# Patient Record
Sex: FEMALE | Race: ASIAN | NOT HISPANIC OR LATINO | Employment: PART TIME | URBAN - METROPOLITAN AREA
[De-identification: names, ages, dates, MRNs, and addresses within clinical notes are randomized per-mention and may not be internally consistent; named-entity substitution may affect disease eponyms.]

---

## 2017-03-16 ENCOUNTER — OFFICE VISIT (OUTPATIENT)
Dept: FAMILY MEDICINE | Facility: CLINIC | Age: 32
End: 2017-03-16
Payer: COMMERCIAL

## 2017-03-16 VITALS
DIASTOLIC BLOOD PRESSURE: 77 MMHG | WEIGHT: 142 LBS | HEART RATE: 83 BPM | OXYGEN SATURATION: 97 % | HEIGHT: 58 IN | TEMPERATURE: 98.3 F | BODY MASS INDEX: 29.81 KG/M2 | SYSTOLIC BLOOD PRESSURE: 113 MMHG

## 2017-03-16 DIAGNOSIS — Z13.1 SCREENING FOR DIABETES MELLITUS: ICD-10-CM

## 2017-03-16 DIAGNOSIS — Z00.00 ROUTINE GENERAL MEDICAL EXAMINATION AT A HEALTH CARE FACILITY: Primary | ICD-10-CM

## 2017-03-16 DIAGNOSIS — E66.3 OVERWEIGHT (BMI 25.0-29.9): ICD-10-CM

## 2017-03-16 DIAGNOSIS — Z23 NEED FOR PROPHYLACTIC VACCINATION WITH TETANUS-DIPHTHERIA (TD): ICD-10-CM

## 2017-03-16 LAB
HBA1C MFR BLD: 5.7 % (ref 4.3–6)
LDLC SERPL DIRECT ASSAY-MCNC: 73 MG/DL

## 2017-03-16 PROCEDURE — 36415 COLL VENOUS BLD VENIPUNCTURE: CPT | Performed by: PREVENTIVE MEDICINE

## 2017-03-16 PROCEDURE — 83036 HEMOGLOBIN GLYCOSYLATED A1C: CPT | Performed by: PREVENTIVE MEDICINE

## 2017-03-16 PROCEDURE — 83721 ASSAY OF BLOOD LIPOPROTEIN: CPT | Performed by: PREVENTIVE MEDICINE

## 2017-03-16 PROCEDURE — 90471 IMMUNIZATION ADMIN: CPT | Performed by: PREVENTIVE MEDICINE

## 2017-03-16 PROCEDURE — 99395 PREV VISIT EST AGE 18-39: CPT | Mod: 25 | Performed by: PREVENTIVE MEDICINE

## 2017-03-16 PROCEDURE — 90715 TDAP VACCINE 7 YRS/> IM: CPT | Performed by: PREVENTIVE MEDICINE

## 2017-03-16 ASSESSMENT — PAIN SCALES - GENERAL: PAINLEVEL: NO PAIN (0)

## 2017-03-16 NOTE — PROGRESS NOTES
SUBJECTIVE:     CC: Tea Raygoza is an 31 year old woman who presents for preventive health visit.     Healthy Habits:    Do you get at least three servings of calcium containing foods daily (dairy, green leafy vegetables, etc.)? yes    Amount of exercise or daily activities, outside of work: 3 day(s) per week    Problems taking medications regularly not applicable    Medication side effects: No    Have you had an eye exam in the past two years? yes    Do you see a dentist twice per year? no    Do you have sleep apnea, excessive snoring or daytime drowsiness?no      Answers for HPI/ROS submitted by the patient on 3/15/2017   Annual Exam:  Getting at least 3 servings of Calcium per day:: Yes  Bi-annual eye exam:: Yes  Dental care twice a year:: NO  Sleep apnea or symptoms of sleep apnea:: None  Diet:: Regular (no restrictions)  Frequency of exercise:: 2-3 days/week  Taking medications regularly:: Not Applicable  Medication side effects:: Not applicable  Additional concerns today:: YES  Q1: Little interest or pleasure in doing things: 1=Several days  Q2: Feeling down, depressed or hopeless: 0=Not at all  PHQ-2 Score: 1  Duration of exercise:: 15-30 minutes    Has some concerns about swelling around armpit with friction when exercising. Exam consistent with adipose tissue.     Today's PHQ-2 Score:   PHQ-2 ( 1999 Pfizer) 3/15/2017 3/11/2016   Q1: Little interest or pleasure in doing things - 0   Q2: Feeling down, depressed or hopeless - 0   PHQ-2 Score - 0   Little interest or pleasure in doing things Several days -   Feeling down, depressed or hopeless Not at all -   PHQ-2 Score 1 -       Abuse: Current or Past(Physical, Sexual or Emotional)- No  Do you feel safe in your environment - Yes    Social History   Substance Use Topics     Smoking status: Never Smoker     Smokeless tobacco: Never Used     Alcohol use No     The patient does not drink >3 drinks per day nor >7 drinks per week.    Recent Labs   Lab Test   08/31/11   0913   CHOL  159   HDL  32*   LDL  84   TRIG  215*   CHOLHDLRATIO  5.0       Reviewed orders with patient.  Reviewed health maintenance and updated orders accordingly - Yes    Mammo Decision Support:  Mammogram not appropriate for this patient based on age.    Pertinent mammograms are reviewed under the imaging tab.  History of abnormal Pap smear: NO - age 30-65 PAP every 5 years with negative HPV co-testing recommended    Reviewed and updated as needed this visit by clinical staff  Tobacco  Allergies  Meds         Reviewed and updated as needed this visit by Provider        Past Medical History   Diagnosis Date     NO ACTIVE PROBLEMS       Past Surgical History   Procedure Laterality Date     No history of surgery         ROS:  C: NEGATIVE for fever, chills, change in weight  I: NEGATIVE for worrisome rashes, moles or lesions  E: NEGATIVE for vision changes or irritation  ENT: NEGATIVE for ear, mouth and throat problems  R: NEGATIVE for significant cough or SOB  B: NEGATIVE for masses, tenderness or discharge  CV: NEGATIVE for chest pain, palpitations or peripheral edema  GI: NEGATIVE for nausea, abdominal pain, heartburn, or change in bowel habits  : NEGATIVE for unusual urinary or vaginal symptoms. Periods are regular.  M: NEGATIVE for significant arthralgias or myalgia  N: NEGATIVE for weakness, dizziness or paresthesias  E: NEGATIVE for temperature intolerance, skin/hair changes  H: NEGATIVE for bleeding problems  P: NEGATIVE for changes in mood or affect    Problem list, Medication list, Allergies, and Medical/Social/Surgical histories reviewed in EPIC and updated as appropriate.  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      No current outpatient prescriptions on file.     Allergies   Allergen Reactions     Nkda [No Known Drug Allergies]      OBJECTIVE:     /77  Pulse 83  Temp 98.3  F (36.8  C) (Oral)  Ht 4' 10\" (1.473 m)  Wt 142 lb (64.4 kg)  LMP 03/06/2017 (Exact Date)  SpO2 97%  Breastfeeding? No  BMI 29.68 kg/m2  EXAM:  GENERAL: healthy, alert and no distress  EYES: Eyes grossly normal to inspection, PERRL and conjunctivae and sclerae normal  HENT: ear canals and TM's normal, nose and mouth without ulcers or lesions  NECK: no adenopathy, no asymmetry, masses, or scars and thyroid normal to palpation  RESP: lungs clear to auscultation - no rales, rhonchi or wheezes  BREAST: normal without masses, tenderness or nipple discharge and no palpable axillary masses or adenopathy,   CV: regular rate and rhythm, normal S1 S2, no S3 or S4, no murmur, click or rub, no peripheral edema and peripheral pulses strong  ABDOMEN: soft, nontender, no hepatosplenomegaly, no masses   MS: no gross musculoskeletal defects noted, no edema  SKIN: no suspicious lesions or rashes  NEURO: Normal strength and tone, mentation intact and speech normal  PSYCH: mentation appears normal, affect normal/bright  LYMPH: no cervical, supraclavicular, axillary adenopathy    ASSESSMENT/PLAN:     Tea was seen today for physical.    Diagnoses and all orders for this visit:    Routine general medical examination at a health care facility  -     LDL cholesterol " "direct  -     Hemoglobin A1c    Need for prophylactic vaccination with tetanus-diphtheria (TD)  -     TDAP (ADACEL AGES 11-64)  -     ADMIN 1st VACCINE    Screening for diabetes mellitus  -     Hemoglobin A1c    Overweight (BMI 25.0-29.9)        COUNSELING:   Reviewed preventive health counseling, as reflected in patient instructions       Regular exercise       Healthy diet/nutrition       Vaccinated for: TDAP         reports that she has never smoked. She has never used smokeless tobacco.    Estimated body mass index is 29.68 kg/(m^2) as calculated from the following:    Height as of this encounter: 4' 10\" (1.473 m).    Weight as of this encounter: 142 lb (64.4 kg).   Weight management plan: Discussed healthy diet and exercise guidelines and patient will follow up in 12 months in clinic to re-evaluate.    Counseling Resources:  ATP IV Guidelines  Pooled Cohorts Equation Calculator  Breast Cancer Risk Calculator  FRAX Risk Assessment  ICSI Preventive Guidelines  Dietary Guidelines for Americans, 2010  USDA's MyPlate  ASA Prophylaxis  Lung CA Screening    Caryl Parekh MD MPH    [facility]  "

## 2017-03-16 NOTE — NURSING NOTE
"Chief Complaint   Patient presents with     Physical       Initial /77  Pulse 83  Temp 98.3  F (36.8  C) (Oral)  Ht 4' 10\" (1.473 m)  Wt 142 lb (64.4 kg)  LMP 03/06/2017 (Exact Date)  SpO2 97%  Breastfeeding? No  BMI 29.68 kg/m2 Estimated body mass index is 29.68 kg/(m^2) as calculated from the following:    Height as of this encounter: 4' 10\" (1.473 m).    Weight as of this encounter: 142 lb (64.4 kg).  Medication Reconciliation: complete   Jojo MILAN        "

## 2017-03-16 NOTE — NURSING NOTE
Screening Questionnaire for Adult Immunization    Are you sick today?   No   Do you have allergies to medications, food, a vaccine component or latex?   No   Have you ever had a serious reaction after receiving a vaccination?   No   Do you have a long-term health problem with heart disease, lung disease, asthma, kidney disease, metabolic disease (e.g. diabetes), anemia, or other blood disorder?   No   Do you have cancer, leukemia, HIV/AIDS, or any other immune system problem?   No   In the past 3 months, have you taken medications that affect  your immune system, such as prednisone, other steroids, or anticancer drugs; drugs for the treatment of rheumatoid arthritis, Crohn s disease, or psoriasis; or have you had radiation treatments?   No   Have you had a seizure, or a brain or other nervous system problem?   No   During the past year, have you received a transfusion of blood or blood     products, or been given immune (gamma) globulin or antiviral drug?   No   For women: Are you pregnant or is there a chance you could become        pregnant during the next month?   No   Have you received any vaccinations in the past 4 weeks?   No     Immunization questionnaire answers were all negative.      MNVFC doesn't apply on this patient    Per orders of Dr. Parekh, injection of Tdap given by Marci Bhakta. Patient instructed to remain in clinic for 20 minutes afterwards, and to report any adverse reaction to me immediately.       Screening performed by Marci Bhakta on 3/16/2017 at 1:42 PM.

## 2017-03-16 NOTE — MR AVS SNAPSHOT
After Visit Summary   3/16/2017    Tea Raygoza    MRN: [id number]           Patient Information     Date Of Birth          1985        Visit Information        Provider Department      3/16/2017 1:00 PM Caryl Parekh MD [facility]        Today's Diagnoses     Routine general medical examination at a health care facility    -  1    Need for prophylactic vaccination with tetanus-diphtheria (TD)        Screening for diabetes mellitus        Overweight (BMI 25.0-29.9)          Care Instructions    At [facility], we strive to deliver an exceptional experience to you, every time we see you.    If you receive a survey in the mail, please send us back your thoughts. We really do value your feedback.    Thank you for visiting [facility]    Normal or non-critical lab and imaging results will be communicated to you by MyChart, letter or phone within 7 days.  If you do not hear from us within 10 days, please call the clinic. If you have a critical or abnormal lab result, we will notify you by phone as soon as possible.     If you have any questions regarding your visit please contact:     Team Dianne/Spirit  Clinic Hours Telephone Number   Dr. Danny Greenwood   7am-7pm  Monday through Thursday  7am-5pm Friday [phone]  Jennifer ALAS RN   Pharmacy 8:30am-9pm Monday-Friday    9am-5pm Saturday-Sunday [phone]   Urgent Care 11am-9pm Monday-Friday        9am-5pm Saturday-Sunday [phone]     After hours, weekend or if you need to make an appointment with your primary provider please call [phone].   After Hours nurse advise: call [facility]: [phone]    Use MagForcehart (secure email communication and access to your chart) to send your primary care provider a message or make an appointment. Ask someone on your Team  how to sign up for [employer]. To log on to [employer] or for more information in LaunchRock please visit the website at [email or web address].   As of October 8, 2013, all password changes, disabled accounts, or ID changes in [employer]/MyHealth will be done by our Access Services Department.   If you need help with your account or password, call: [phone]. Clinic staff no longer has the ability to change passwords.     Preventive Health Recommendations  Female Ages 26 - 39  Yearly exam:   See your health care provider every year in order to    Review health changes.     Discuss preventive care.      Review your medicines if you your doctor has prescribed any.    Until age 30: Get a Pap test every three years (more often if you have had an abnormal result).    After age 30: Talk to your doctor about whether you should have a Pap test every 3 years or have a Pap test with HPV screening every 5 years.   You do not need a Pap test if your uterus was removed (hysterectomy) and you have not had cancer.  You should be tested each year for STDs (sexually transmitted diseases), if you're at risk.   Talk to your provider about how often to have your cholesterol checked.  If you are at risk for diabetes, you should have a diabetes test (fasting glucose).  Shots: Get a flu shot each year. Get a tetanus shot every 10 years.   Nutrition:     Eat at least 5 servings of fruits and vegetables each day.    Eat whole-grain bread, whole-wheat pasta and brown rice instead of white grains and rice.    Talk to your provider about Calcium and Vitamin D.     Lifestyle    Exercise at least 150 minutes a week (30 minutes a day, 5 days of the week). This will help you control your weight and prevent disease.    Limit alcohol to one drink per day.    No smoking.     Wear sunscreen to prevent skin cancer.    See your dentist every six months for an exam and cleaning.          Follow-ups after your visit        Follow-up notes from your care team   "   Return in about 1 year (around 3/16/2018) for Routine Visit.      Who to contact     If you have questions or need follow up information about today's clinic visit or your schedule please contact [facility] TARA IRVIN directly at [phone].  Normal or non-critical lab and imaging results will be communicated to you by MyChart, letter or phone within 4 business days after the clinic has received the results. If you do not hear from us within 7 days, please contact the clinic through Runivermaghart or phone. If you have a critical or abnormal lab result, we will notify you by phone as soon as possible.  Submit refill requests through Trovix or call your pharmacy and they will forward the refill request to us. Please allow 3 business days for your refill to be completed.          Additional Information About Your Visit        MyChart Information     Trovix gives you secure access to your electronic health record. If you see a primary care provider, you can also send messages to your care team and make appointments. If you have questions, please call your primary care clinic.  If you do not have a primary care provider, please call [phone] and they will assist you.        Care EveryWhere ID     This is your Care EveryWhere ID. This could be used by other organizations to access your [place] medical records  [id number]        Your Vitals Were     Pulse Temperature Height Last Period Pulse Oximetry Breastfeeding?    83 98.3  F (36.8  C) (Oral) 4' 10\" (1.473 m) 03/06/2017 (Exact Date) 97% No    BMI (Body Mass Index)                   29.68 kg/m2            Blood Pressure from Last 3 Encounters:   03/16/17 113/77   03/11/16 96/68   08/14/14 102/72    Weight from Last 3 Encounters:   03/16/17 142 lb (64.4 kg)   03/11/16 137 lb 2 oz (62.2 kg)   08/14/14 130 lb (59 kg)              We Performed the Following     ADMIN 1st VACCINE     Hemoglobin A1c     LDL cholesterol direct     TDAP (ADACEL AGES 11-64)     "    Primary Care Provider Fax #    [facility] [phone] [address]  [place] [address]        Thank you!     Thank you for choosing [facility]  for your care. Our goal is always to provide you with excellent care. Hearing back from our patients is one way we can continue to improve our services. Please take a few minutes to complete the written survey that you may receive in the mail after your visit with us. Thank you!             Your Updated Medication List - Protect others around you: Learn how to safely use, store and throw away your medicines at www.disposemymeds.org.      Notice  As of 3/16/2017  1:49 PM    You have not been prescribed any medications.

## 2017-03-16 NOTE — PATIENT INSTRUCTIONS
At [facility], we strive to deliver an exceptional experience to you, every time we see you.    If you receive a survey in the mail, please send us back your thoughts. We really do value your feedback.    Thank you for visiting [facility]    Normal or non-critical lab and imaging results will be communicated to you by MyChart, letter or phone within 7 days.  If you do not hear from us within 10 days, please call the clinic. If you have a critical or abnormal lab result, we will notify you by phone as soon as possible.     If you have any questions regarding your visit please contact:     Team Dianne/Spirit  Clinic Hours Telephone Number   Dr. Danny Greenwood   7am-7pm  Monday through Thursday  7am-5pm Friday [phone]  Jennifer ALAS RN   Pharmacy 8:30am-9pm Monday-Friday    9am-5pm Saturday-Sunday [phone]   Urgent Care 11am-9pm Monday-Friday        9am-5pm Saturday-Sunday [phone]     After hours, weekend or if you need to make an appointment with your primary provider please call [phone].   After Hours nurse advise: call [facility] [facility]: [phone]    Use [employer] Solutionshart (secure email communication and access to your chart) to send your primary care provider a message or make an appointment. Ask someone on your Team how to sign up for [employer]. To log on to BigTeams or for more information in DDN please visit the website at [email or web address].   As of October 8, 2013, all password changes, disabled accounts, or ID changes in AirXP/MyHealth will be done by our Access Services Department.   If you need help with your account or password, call: [phone]. Clinic staff no longer has the ability to change passwords.     Preventive Health Recommendations  Female Ages 26 - 39  Yearly exam:   See your health care provider every year in order  to    Review health changes.     Discuss preventive care.      Review your medicines if you your doctor has prescribed any.    Until age 30: Get a Pap test every three years (more often if you have had an abnormal result).    After age 30: Talk to your doctor about whether you should have a Pap test every 3 years or have a Pap test with HPV screening every 5 years.   You do not need a Pap test if your uterus was removed (hysterectomy) and you have not had cancer.  You should be tested each year for STDs (sexually transmitted diseases), if you're at risk.   Talk to your provider about how often to have your cholesterol checked.  If you are at risk for diabetes, you should have a diabetes test (fasting glucose).  Shots: Get a flu shot each year. Get a tetanus shot every 10 years.   Nutrition:     Eat at least 5 servings of fruits and vegetables each day.    Eat whole-grain bread, whole-wheat pasta and brown rice instead of white grains and rice.    Talk to your provider about Calcium and Vitamin D.     Lifestyle    Exercise at least 150 minutes a week (30 minutes a day, 5 days of the week). This will help you control your weight and prevent disease.    Limit alcohol to one drink per day.    No smoking.     Wear sunscreen to prevent skin cancer.    See your dentist every six months for an exam and cleaning.

## 2017-03-19 NOTE — PROGRESS NOTES
Tea, your test results were within normal limits. LDL cholesterol is at goal for you. Three month glucose value is normal, you do not have diabetes.     Please do not hesitate to call us at [phone] if you have any questions or concerns.    Thank you,    Caryl Parekh MD MPH

## 2017-11-08 ENCOUNTER — OFFICE VISIT (OUTPATIENT)
Dept: FAMILY MEDICINE | Facility: CLINIC | Age: 32
End: 2017-11-08
Payer: COMMERCIAL

## 2017-11-08 VITALS
HEART RATE: 89 BPM | OXYGEN SATURATION: 97 % | BODY MASS INDEX: 29.59 KG/M2 | DIASTOLIC BLOOD PRESSURE: 74 MMHG | SYSTOLIC BLOOD PRESSURE: 110 MMHG | WEIGHT: 141.6 LBS | TEMPERATURE: 98.8 F

## 2017-11-08 DIAGNOSIS — G56.01 CARPAL TUNNEL SYNDROME OF RIGHT WRIST: Primary | ICD-10-CM

## 2017-11-08 DIAGNOSIS — M67.431 GANGLION CYST OF WRIST, RIGHT: ICD-10-CM

## 2017-11-08 PROCEDURE — 99213 OFFICE O/P EST LOW 20 MIN: CPT | Performed by: PHYSICIAN ASSISTANT

## 2017-11-08 NOTE — NURSING NOTE
"Chief Complaint   Patient presents with     Musculoskeletal Problem     Startedlast week with numbness and tingling and yesterday and it got worse with a bump       Initial /74 (BP Location: Left arm, Patient Position: Chair, Cuff Size: Adult Regular)  Pulse 89  Temp 98.8  F (37.1  C) (Oral)  Wt 141 lb 9.6 oz (64.2 kg)  SpO2 97%  Breastfeeding? No  BMI 29.59 kg/m2 Estimated body mass index is 29.59 kg/(m^2) as calculated from the following:    Height as of 3/16/17: 4' 10\" (1.473 m).    Weight as of this encounter: 141 lb 9.6 oz (64.2 kg).  Medication Reconciliation: complete   Marcus Pool MA        "

## 2017-11-08 NOTE — PROGRESS NOTES
SUBJECTIVE:   Tea Raygoza is a 31 year old female who presents to clinic today for the following health issues:      Joint Pain    Onset: Last week    Description:   Location: right wrist  Character: numbness and tingling to palmar hand and forearm with a dorsal painless bump- got worse after sewing and doing some yoga    Intensity: severe    Progression of Symptoms: worse    Accompanying Signs & Symptoms:  Other symptoms: numbness, tingling and swelling    History:   Previous similar pain: no       Precipitating factors:   Trauma or overuse: no     Alleviating factors:  Improved by: nothing    Therapies Tried and outcome: none        does a lot of typing at work and lots of housework      Allergies   Allergen Reactions     Nkda [No Known Drug Allergies]        Past Medical History:   Diagnosis Date     NO ACTIVE PROBLEMS          No current outpatient prescriptions on file prior to visit.  No current facility-administered medications on file prior to visit.     Social History   Substance Use Topics     Smoking status: Never Smoker     Smokeless tobacco: Never Used     Alcohol use No       ROS:  Gen: np fevers  Musculoskel: + as above  Skin: as above    OBJECTIVE:  /74 (BP Location: Left arm, Patient Position: Chair, Cuff Size: Adult Regular)  Pulse 89  Temp 98.8  F (37.1  C) (Oral)  Wt 141 lb 9.6 oz (64.2 kg)  SpO2 97%  Breastfeeding? No  BMI 29.59 kg/m2   General:   awake, alert, and cooperative.  NAD.   Head: Normocephalic, atraumatic.  Eyes: Conjunctiva clear,   MS:  Rt wrist, KOBE no TTP no swelling and bruising. cap refill intact, radial pulse intact, + phalen and tinnel, rt dorsal wrist with soft, fluctuant ,nonttp, non erythematous cystic lesions ~ 1 cm diameter  Neuro: Alert and oriented - normal speech.    ASSESSMENT:    ICD-10-CM    1. Carpal tunnel syndrome of right wrist G56.01 [place] [facility]     [place]ULT [place]FERR[place]   2. Ganglion cyst of wrist, right M67.431 ORTHOPEDICS ADULT  REFERRAL           PLAN: NSAIDS  Advised about symptoms which might herald more serious problems.

## 2017-11-08 NOTE — MR AVS SNAPSHOT
After Visit Summary   11/8/2017    Tea Raygoza    MRN: [id number]           Patient Information     Date Of Birth          1985        Visit Information        Provider Department      11/8/2017 2:20 PM Pritesh Richardson PA [facility]        Today's Diagnoses     Carpal tunnel syndrome of right wrist    -  1    Ganglion cyst of wrist, right          Care Instructions    At [facility], we strive to deliver an exceptional experience to you, every time we see you.  If you receive a survey in the mail, please send us back your thoughts. We really do value your feedback.    Based on your medical history, these are the current health maintenance/preventive care services that you are due for (some may have been done at this visit.)  Health Maintenance Due   Topic Date Due     INFLUENZA VACCINE (SYSTEM ASSIGNED)  09/01/2017         Suggested websites for health information:  WwwThingWorx : Up to date and easily searchable information on multiple topics.  Www.Ubiregi.gov : medication info, interactive tutorials, watch real surgeries online  Www.familydoctor.org : good info from the Academy of Family Physicians  Www.cdc.gov : public health info, travel advisories, epidemics (H1N1)  Www.aap.org : children's health info, normal development, vaccinations  Www.health.[facility].[place].us : [place] dept of health, public health issues in [place], N1N1    Your care team:                            Family Medicine Internal Medicine   MD Zack Kamara MD Shantel Branch-Fleming, MD Katya Georgiev PA-C Nam Ho, MD Pediatrics   MEÑO Barker, LAINA Greenwood APRN MD Flores Rebollar MD Deborah Mielke, MD Kim Thein, JONNATHAN CNP      Clinic hours: Monday - Thursday 7 am-7 pm; Fridays 7 am-5 pm.   Urgent care: Monday - Friday 11 am-9 pm; Saturday and Sunday 9 am-5 pm.  Pharmacy : Monday -Thursday 8 am-8  pm; Friday 8 am-6 pm; Saturday and Sunday 9 am-5 pm.     Clinic: [phone]   Pharmacy: [phone]    Carpal Tunnel Syndrome    Carpal tunnel syndrome is a painful condition of the wrist and arm. It is caused by pressure on the median nerve.  The median nerve is one of the nerves that give feeling and movement to the hand. It passes through a tunnel in the wrist called the carpal tunnel. This tunnel is made up of bones and ligaments. Narrowing of this tunnel or swelling of the tissues inside the tunnel puts pressure on the median nerve. This causes numbness, pins and needles, or electric shooting pains in your hand and forearm. Often the pain is worse at night and may wake you when you are asleep.  Carpal tunnel syndrome may occur during pregnancy and with use of birth control pills. It is more common in workers who must often bend their wrists. It is also common in people who work with power tools that cause strong vibrations.  Home care    Rest the painful wrist. Avoid repeated bending of the wrist back and forth. This puts pressure on the median nerve. Avoid using power tools with strong vibrations.    If you were given a splint, wear it at night while you sleep. You may also wear it during the day for comfort.    Move your fingers and wrists often to avoid stiffness.    Elevate your arms on pillows when you lie down.    Try using the unaffected hand more.    Try not to hold your wrists in a bent, downward position.    Sometimes changes in the work place may ease symptoms. If you type most of the day, it may help to change the position of your keyboard or add a wrist support. Your wrist should be in a neutral position and not bent back when typing.    You may use over-the-counter pain medicine to treat pain and inflammation, unless another medicine was prescribed. Anti-inflammatory pain medicines, such as ibuprofen or naproxen may be more effective than acetaminophen, which treats pain, but not  inflammation. If you have chronic liver or kidney disease or ever had a stomach ulcer or GI bleeding, talk with your doctor before using these medicines.    Opioid pain medicine will only give temporary relief and does not treat the problem. If pain continues, you may need a shot of a steroid drug into your wrist.    If the above methods fail, you may need surgery. This will open the carpal tunnel and release the pressure on the trapped nerve.  Follow-up care  Follow up with your healthcare provider, or as advised, if the pain doesn t begin to improve within the next week.  If X-rays were taken, you will be notified of any new findings that may affect your care.  When to seek medical advice  Call your healthcare provider right away if any of these occur:    Pain not improving with the above treatment    Fingers or hand become cold, blue, numb, or tingly    Your whole arm becomes swollen or weak  Date Last Reviewed: 11/23/2015 2000-2017 [employer]. [address] [place] [address]. All rights reserved. This information is not intended as a substitute for professional medical care. Always follow your healthcare professional's instructions.        Ganglion Cyst    A ganglion cyst usually is a painless bump on the wrist or finger joint. It connects to the joint capsule and grows like a balloon on a stalk. It is filled with joint fluid. The cause of a ganglion cyst is not known.   If the cyst puts pressure on a nearby nerve it may cause pain. Otherwise, cysts are painless and harmless. Most tend to disappear over time without treatment. Do not try to drain or break the cyst at home. This can cause harm and does not cure the problem.  If you are having pain from the cyst, a temporary wrist splint may be helpful to limit wrist motion. If this does not help, the fluid can be removed from the cyst. This should shrink the size of the cyst. If this doesn t give relief, the ganglion can be removed by  surgery.  Home care    If you are having wrist pain, use a wrist splint for 1-2 weeks at a time. You can buy one at many drug stores without a prescription.    You may use over-the-counter pain medicine to control pain, unless another medicine was prescribed. If you have chronic liver or kidney disease or ever had a stomach ulcer or GI bleeding, talk with your healthcare provider before using these medicines.      If a needle was used to drain the cyst fluid or inject medicine into it, keep the site clean and covered with a bandage for the first 24 hours. If a pressure dressing was applied, leave it in place for the time advised.  Follow-up care  Follow up with your healthcare provider, or as advised by our staff. Make an appointment for a repeat exam if pain continues for more than 2 weeks in a wrist splint.  When to seek medical advice  Call your healthcare provider right away if any of these occur:    Increasing pain in the wrist    Redness over the cyst    Fluid draining from the cyst    Numbness or tingling in the hand or arm  Date Last Reviewed: 11/20/2015 2000-2017 [employer]. [address] [place] [address]. All rights reserved. This information is not intended as a substitute for professional medical care. Always follow your healthcare professional's instructions.                Follow-ups after your visit        Additional Services     ORTHOPEDICS ADULT REFERRAL       Your provider has referred you to: [facility]: [facility] [phone]    [email or web address]    Please be aware that coverage of these services is subject to the terms and limitations of your health insurance plan.  Call member services at your health plan with any benefit or coverage questions.      Please bring the following to your appointment:    >>   Any x-rays, CTs or MRIs which have been performed.  Contact the facility where they were done to arrange  for  prior to your scheduled appointment.  Any new CT, MRI or other procedures ordered by your specialist must be performed at a [facility] facility or coordinated by your clinic's referral office.    >>   List of current medications   >>   This referral request   >>   Any documents/labs given to you for this referral                  Follow-up notes from your care team     Return if symptoms worsen or fail to improve.      Who to contact     If you have questions or need follow up information about today's clinic visit or your schedule please contact [facility] TARA BRYAN directly at [phone].  Normal or non-critical lab and imaging results will be communicated to you by [employer], letter or phone within 4 business days after the clinic has received the results. If you do not hear from us within 7 days, please contact the clinic through [email or web address] or phone. If you have a critical or abnormal lab result, we will notify you by phone as soon as possible.  Submit refill requests through [employer] or call your pharmacy and they will forward the refill request to us. Please allow 3 business days for your refill to be completed.          Additional Information About Your Visit        MyChart Information     [employer] gives you secure access to your electronic health record. If you see a primary care provider, you can also send messages to your care team and make appointments. If you have questions, please call your primary care clinic.  If you do not have a primary care provider, please call [phone] and they will assist you.        Care EveryWhere ID     This is your Care EveryWhere ID. This could be used by other organizations to access your [facility] medical records  [id number]        Your Vitals Were     Pulse Temperature Pulse Oximetry Breastfeeding? BMI (Body Mass Index)       89 98.8  F (37.1  C) (Oral) 97% No 29.59 kg/m2        Blood Pressure from Last 3 Encounters:   11/08/17 110/74   03/16/17  113/77   03/11/16 96/68    Weight from Last 3 Encounters:   11/08/17 141 lb 9.6 oz (64.2 kg)   03/16/17 142 lb (64.4 kg)   03/11/16 137 lb 2 oz (62.2 kg)              We Performed the Following     ORTHOPEDICS ADULT REFERRAL          Today's Medication Changes          These changes are accurate as of: 11/8/17  2:59 PM.  If you have any questions, ask your nurse or doctor.               Start taking these medicines.        Dose/Directions    order for DME   Used for:  Carpal tunnel syndrome of right wrist   Started by:  Pritesh Richardson PA        Dose:  1 Device   1 Device by Device route once for 1 dose Right wrist brace - use as instructed   Quantity:  1 Device   Refills:  0            Where to get your medicines      Some of these will need a paper prescription and others can be bought over the counter.  Ask your nurse if you have questions.     You don't need a prescription for these medications     order for DME                Primary Care Provider Fax #    [facility] [phone] [address] [facility] [address]  [facility] [address]        Equal Access to Services     [facility] Hospital[facility] FRANCISCO : Hadii lashae nicole hadasho Sovalarie, waaxda luqadaha, qaybta kaalmada adeegyada, ginger goel. So [facility] [phone].    ATENCIÓN: Si habla español, tiene a poole disposición servicios gratuitos de asistencia lingüística. Llame al [phone].    We comply with applicable federal civil rights laws and [place] laws. We do not discriminate on the basis of race, color, national origin, age, disability, sex, sexual orientation, or gender identity.            Thank you!     Thank you for choosing [facility]  for your care. Our goal is always to provide you with excellent care. Hearing back from our patients is one way we can continue to improve our services. Please take a few minutes to complete the written survey that you may receive in the mail after  your visit with us. Thank you!             Your Updated Medication List - Protect others around you: Learn how to safely use, store and throw away your medicines at www.disposemymeds.org.          This list is accurate as of: 11/8/17  2:59 PM.  Always use your most recent med list.                   Brand Name Dispense Instructions for use Diagnosis    order for DME     1 Device    1 Device by Device route once for 1 dose Right wrist brace - use as instructed    Carpal tunnel syndrome of right wrist

## 2017-11-08 NOTE — PATIENT INSTRUCTIONS
At [facility], we strive to deliver an exceptional experience to you, every time we see you.  If you receive a survey in the mail, please send us back your thoughts. We really do value your feedback.    Based on your medical history, these are the current health maintenance/preventive care services that you are due for (some may have been done at this visit.)  Health Maintenance Due   Topic Date Due     INFLUENZA VACCINE (SYSTEM ASSIGNED)  09/01/2017         Suggested websites for health information:  Www.[employer].org : Up to date and easily searchable information on multiple topics.  Www.[employer].gov : medication info, interactive tutorials, watch real surgeries online  Www.familydoctor.org : good info from the Academy of Family Physicians  Www.cdc.gov : public health info, travel advisories, epidemics (H1N1)  Www.aap.org : children's health info, normal development, vaccinations  Www.health.[facility].mn.us : [place] dept of health, public health issues in [place], N1N1    Your care team:                            Family Medicine Internal Medicine   MD Zack Kamara MD Shantel Branch-Fleming, MD Katya Georgiev PA-C Nam Ho, MD Pediatrics   MEÑO Barker, CNP Mila Greenwood APRN CNP   MD Flores Sabillon MD Deborah Mielke, MD Kim Thein, APRN CNP      Clinic hours: Monday - Thursday 7 am-7 pm; Fridays 7 am-5 pm.   Urgent care: Monday - Friday 11 am-9 pm; Saturday and Sunday 9 am-5 pm.  Pharmacy : Monday -Thursday 8 am-8 pm; Friday 8 am-6 pm; Saturday and Sunday 9 am-5 pm.     Clinic: [phone]   Pharmacy: [phone]    Carpal Tunnel Syndrome    Carpal tunnel syndrome is a painful condition of the wrist and arm. It is caused by pressure on the median nerve.  The median nerve is one of the nerves that give feeling and movement to the hand. It passes through a tunnel in the wrist called the carpal tunnel. This tunnel is made up of  bones and ligaments. Narrowing of this tunnel or swelling of the tissues inside the tunnel puts pressure on the median nerve. This causes numbness, pins and needles, or electric shooting pains in your hand and forearm. Often the pain is worse at night and may wake you when you are asleep.  Carpal tunnel syndrome may occur during pregnancy and with use of birth control pills. It is more common in workers who must often bend their wrists. It is also common in people who work with power tools that cause strong vibrations.  Home care    Rest the painful wrist. Avoid repeated bending of the wrist back and forth. This puts pressure on the median nerve. Avoid using power tools with strong vibrations.    If you were given a splint, wear it at night while you sleep. You may also wear it during the day for comfort.    Move your fingers and wrists often to avoid stiffness.    Elevate your arms on pillows when you lie down.    Try using the unaffected hand more.    Try not to hold your wrists in a bent, downward position.    Sometimes changes in the work place may ease symptoms. If you type most of the day, it may help to change the position of your keyboard or add a wrist support. Your wrist should be in a neutral position and not bent back when typing.    You may use over-the-counter pain medicine to treat pain and inflammation, unless another medicine was prescribed. Anti-inflammatory pain medicines, such as ibuprofen or naproxen may be more effective than acetaminophen, which treats pain, but not inflammation. If you have chronic liver or kidney disease or ever had a stomach ulcer or GI bleeding, talk with your doctor before using these medicines.    Opioid pain medicine will only give temporary relief and does not treat the problem. If pain continues, you may need a shot of a steroid drug into your wrist.    If the above methods fail, you may need surgery. This will open the carpal tunnel and release the pressure on the  trapped nerve.  Follow-up care  Follow up with your healthcare provider, or as advised, if the pain doesn t begin to improve within the next week.  If X-rays were taken, you will be notified of any new findings that may affect your care.  When to seek medical advice  Call your healthcare provider right away if any of these occur:    Pain not improving with the above treatment    Fingers or hand become cold, blue, numb, or tingly    Your whole arm becomes swollen or weak  Date Last Reviewed: 11/23/2015 2000-2017 [employer]. [address] [place] [address]. All rights reserved. This information is not intended as a substitute for professional medical care. Always follow your healthcare professional's instructions.        Ganglion Cyst    A ganglion cyst usually is a painless bump on the wrist or finger joint. It connects to the joint capsule and grows like a balloon on a stalk. It is filled with joint fluid. The cause of a ganglion cyst is not known.   If the cyst puts pressure on a nearby nerve it may cause pain. Otherwise, cysts are painless and harmless. Most tend to disappear over time without treatment. Do not try to drain or break the cyst at home. This can cause harm and does not cure the problem.  If you are having pain from the cyst, a temporary wrist splint may be helpful to limit wrist motion. If this does not help, the fluid can be removed from the cyst. This should shrink the size of the cyst. If this doesn t give relief, the ganglion can be removed by surgery.  Home care    If you are having wrist pain, use a wrist splint for 1-2 weeks at a time. You can buy one at many drug stores without a prescription.    You may use over-the-counter pain medicine to control pain, unless another medicine was prescribed. If you have chronic liver or kidney disease or ever had a stomach ulcer or GI bleeding, talk with your healthcare provider before using these medicines.      If a needle was  used to drain the cyst fluid or inject medicine into it, keep the site clean and covered with a bandage for the first 24 hours. If a pressure dressing was applied, leave it in place for the time advised.  Follow-up care  Follow up with your healthcare provider, or as advised by our staff. Make an appointment for a repeat exam if pain continues for more than 2 weeks in a wrist splint.  When to seek medical advice  Call your healthcare provider right away if any of these occur:    Increasing pain in the wrist    Redness over the cyst    Fluid draining from the cyst    Numbness or tingling in the hand or arm  Date Last Reviewed: 11/20/2015 2000-2017 [employer]. [address] [place] [address], [place] [address]. All rights reserved. This information is not intended as a substitute for professional medical care. Always follow your healthcare professional's instructions.

## 2017-11-09 ENCOUNTER — OFFICE VISIT (OUTPATIENT)
Dept: ORTHOPEDICS | Facility: CLINIC | Age: 32
End: 2017-11-09
Payer: COMMERCIAL

## 2017-11-09 ENCOUNTER — RADIANT APPOINTMENT (OUTPATIENT)
Dept: GENERAL RADIOLOGY | Facility: CLINIC | Age: 32
End: 2017-11-09
Attending: PHYSICIAN ASSISTANT
Payer: COMMERCIAL

## 2017-11-09 VITALS — WEIGHT: 142 LBS | BODY MASS INDEX: 28.63 KG/M2 | RESPIRATION RATE: 16 BRPM | HEIGHT: 59 IN

## 2017-11-09 DIAGNOSIS — M67.431 GANGLION CYST OF DORSUM OF RIGHT WRIST: ICD-10-CM

## 2017-11-09 DIAGNOSIS — R22.31 MASS OF WRIST, RIGHT: ICD-10-CM

## 2017-11-09 DIAGNOSIS — G56.01 CARPAL TUNNEL SYNDROME OF RIGHT WRIST: Primary | ICD-10-CM

## 2017-11-09 PROCEDURE — 99243 OFF/OP CNSLTJ NEW/EST LOW 30: CPT | Performed by: ORTHOPAEDIC SURGERY

## 2017-11-09 PROCEDURE — 73110 X-RAY EXAM OF WRIST: CPT | Mod: RT

## 2017-11-09 ASSESSMENT — PAIN SCALES - GENERAL: PAINLEVEL: NO PAIN (0)

## 2017-11-09 NOTE — PROGRESS NOTES
Chief Complaint:   Chief Complaint   Patient presents with     Hand Pain     Right dorsal wrist mass and hand numbness. Hand numbness started about 1 week ago and mass showed up about 3 days ago. Rt hand dominant. Went to  yesterday was given a brace for at night and told to take aleve.         Tea Raygoza is seen today in the [facility] for evaluation of right dorsal wrist ganglion cyst, numbness and tingling at the request of LENIN Ryder.     HPI: Tea Raygoza is a 31 year old female , right -hand dominant, who presents for evaluation and management of a right dorsal wrist mass, no known injury. Her symptoms started as numbness and tingling in the right hand and wrist 1 week ago. Numbness and tingling can occur in the fingers, hand, wrist, forearm, and into the elbow.  About 2-3 days ago, while sewing, had some numbness and tingling. She stopped and looked at her hand and noticed a mass had surfaced over the right dorsal wrist. She was seen in urgent care yesterday and was given a wrist brace for the night and was told to take Aleve. She has no pain today, rated a 0/10. Presents today with her .    She reports having no pain/discomfort around the wrist mass. She denies any other similar masses to her upper extremity or other extremities.     Changes in size: No   Changes in color: No   Tenderness of mass: No   Hot/cold sensitivity: No   Pain severity: 0/10  Pain quality: NA  Frequency of symptoms: frequently.  Night pain: No   Fevers, chills, night sweats: No   Aggravating factors: none.  Relieving factors: at rest.    Previous treatment: wrist brace, Aleve    Past medical history:  has a past medical history of NO ACTIVE PROBLEMS.   Patient Active Problem List    Diagnosis Date Noted     Carrier or suspected carrier of group B Streptococcus 05/28/2011     Priority: Medium     Plan PCN IAP.       Anemia of mother, complicating pregnancy, childbirth, or the  "puerperium, unspecified as to episode of care(648.20) 03/26/2011     Priority: Medium     Not immune to rubella 12/24/2010     Priority: Medium     Plan pp vac at hospital.  Do you wish to do the replacement in the background? yes         Encounter for supervision of other normal pregnancy 12/22/2010     Priority: Medium     History of fast labor.  Girl!  Diagnosis updated by automated process. Provider to review and confirm.       CARDIOVASCULAR SCREENING; LDL GOAL LESS THAN 160 10/31/2010     Priority: Medium       Past surgical history:  has a past surgical history that includes no history of surgery.     Medications:    No current outpatient prescriptions on file.        Allergies:     Allergies   Allergen Reactions     Nkda [No Known Drug Allergies]         Family History: family history is not on file.     Social History: .  reports that she has never smoked. She has never used smokeless tobacco. She reports that she does not drink alcohol or use illicit drugs.    Review of Systems:  ROS: 10 point ROS neg other than the symptoms noted above in the HPI and past medical history.    This document serves as a record of the services and decisions personally performed and made by Rommel Parikh MD. It was created on his behalf by Jeane Still, a trained medical scribe. The creation of this document is based the provider's statements to the medical scribe.    Scribe Jeane Still 4:08 PM 11/9/2017    Physical Exam  Resp 16  Ht 1.499 m (4' 11\")  Wt 64.4 kg (142 lb)  BMI 28.68 kg/m2  GENERAL APPEARANCE: healthy, alert, no distress.   SKIN: no suspicious lesions or rashes  RESPIRATORY: No increased work of breathing.  NEURO:     strength: normal,    thenar fasiculations: negative    Thenar atrophy: none.    Sensation intact in  All digits but decreased in radial 3.5 right hand.   reflexes normal in upper extremities.   PSYCH:  mentation appears normal and affect normal, not anxious.    MUSCULOSKELETAL:    RIGHT " HAND / WRIST EXAM:  Skin intact. No abnormal skin discoloration, erythema or ecchymosis.   Normal wear pattern, color and tone.  Sensations are intact of the small and ulnar aspect of the ring finger. Decreased sensations of the radial ring finger, long, index fingers and thumb.    Noticeable mass, ~1.5cm diameter, located over dorsal ulnar wrist just distal to the dorsal prominence of the distal ulna.   Mass does transiluminate with light.  Mass no tender to palpation. Semi-firm, fluctuant.  Negative Tinel's over mass.    No abnormal skin color or atrophic changes over mass.  No other observable or palpable masses of the fingers or palm.  No palpable triggering of fingers.   No observable or palpable cords or nodules of the fingers or palm.    There is no swelling in the wrist, other than the mass  There is no tenderness in the wrist, other than the mass  There is no ecchymosis.  There is no erythema of the surrounding skin.  There is no maceration of the skin.  There is no deformity in the area.  Intact extensors. No extensor lag.    Special tests wrist:    Tinel's positive,    Phalen's positive.   Flexion/compression test positive.    Intact sensation light touch median, radial, ulnar nerves of the hand  Intact sensation to the radial and ulnar digital nerves of the fingers, as well as the finger tips.  Intact epl fpl fdp edc wrist flexion/extension biceps/triceps deltoid  Brisk capillary refill to all fingers.   Palpable radial pulse, 2+.    LEFT HAND/FINGERS:    Skin intact. No abnormal skin discoloration, erythema or ecchymosis.   No nail pitting or clubbing.  Normal wear pattern, color and tone.  No observable or palpable masses of the fingers or palm or wrist.  No palpable triggering of fingers.   No observable or palpable cords or nodules of the fingers or palm.    There is no swelling in the wrist, hand, forearm.  There is no tenderness in the wrist.  There is no ecchymosis.  There is no erythema of the  surrounding skin.  There is no maceration of the skin.  There is no deformity in the area.  Intact extensors. No extensor lag.    Special tests wrist:    Tinel's mild,    Phalen's negative     Median nerve compression test mild.     Intact sensation light touch median, radial, ulnar nerves of the hand  Intact sensation to the radial and ulnar digital nerves of the fingers, as well as the finger tips.  Intact epl fpl fdp edc wrist flexion/extension biceps/triceps deltoid  Brisk capillary refill to all fingers.   Palpable radial pulse, 2+.      X-rays:  3 views right wrist from 11/9/2017 were reviewed in clinic today. On my review, no obvious fractures or deformities.    Assessment: 31 year old female with right dorsal wrist mass, likely ganglion cyst, also bilateral carpal tunnel syndrome (R>L). Symptoms are most likely not related to the cyst given its location.    Plan:   MASS:  Discussed with patient that the mass is consistent with ganglion cyst, a common, benign tumor of the upper extremity. Less likely another type of tumor or neoplasm. Treatment options include: observation if asymptomatic or minimal symptoms, activity modification, splint, aspiration with cortisone injection (risks of recurrence > 50%), or surgical excision (risk of recurrence < 5-10%). Risks and benefits of each discussed in detail.    * in particular, risks of surgery include, but not limited to: bleeding, infection, pain, scar, damage to adjacent structures (nerves, vessels, bone, cartilage, tendons, ligaments), temporary versus permanent nerve injury, failure to relieve symptoms, recurrence of symptoms or recurrence of the mass, stiffness, need for further surgery, risks of anesthesia, blood clots, death.    * at this time, patient would like to proceed with non-operative treatment.  * no treatments at this time other than wrist brace with activities, night.  * return to clinic if mass becomes painful or increases in size. Consider  aspiration versus surgical excision.      CARPAL TUNNEL SYNDROME:  * discussed with patient signs and symptoms consistent with carpal tunnel syndrome, R>L. Carpal tunnel syndrome is compression or pinching of the median nerve at the wrist, as it enters the hand. There are many different causes, and in most cases, multifactorial.    * An indepth discussion was had with her about the options for treatment, which included activity modification to avoid aggravating activities, taking breaks during activities that cause symptoms, stretching, NSAIDS to help decrease inflammation and swelling within the carpal tunnel, night splinting, corticosteroid injections, and carpal tunnel release.   * depending upon severity and duration of symptoms, nonoperative treatment is usually initiated, starting with least invasive modalities such as activity modification and a trial of night splints and NSAIDs.  * Cortisone injections are considered to decrease swelling and inflammation within the carpal tunnel and compression of the nerve.   * Lastly, carpal tunnel release should symptoms persist despite trial of nonoperative treatment, or in cases of severe carpal tunnel syndrome.  * at this time, will proceed with a splint; use splint previously given by another provider.  * return to clinic as needed.  * all patient's questions addressed and answered today.      The information in this document, created by a scribe for me, accurately reflects the services I personally performed and the decisions made by me. I have reviewed and approved this document for accuracy.       Rommel Parikh M.D., M.S.  Dept. of Orthopaedic Surgery  [facility]

## 2017-11-09 NOTE — PATIENT INSTRUCTIONS
Please remember to call and schedule a follow up appointment if one was recommended at your earliest convenience.  Orthopedics CLINIC HOURS TELEPHONE NUMBER   Dr. Kati Evans  Certified Medical Assistant   Monday & Wednesday   8am - 5pm  Thursday 1pm - 5pm  Friday 8am -11:30am Specialty schedulers:   [phone] to schedule your surgery.  Main Clinic:   [phone] to make an appointment with any provider.    Urgent Care locations:    [facility] Monday-Friday Closed  Saturday-Sunday 9am-5pm      Monday-Friday 12pm - 8pm  Saturday-Sunday 9am-5pm [phone]     If SURGERY has been recommended, please call our Specialty Schedulers at [phone] to schedule your procedure.    If you need a medication refill, please contact your pharmacy. Please allow 3 business days for your refill to be completed.    If an MRI or CT scan has been recommended, please call [facility] Schedulers at [phone] to schedule your appointment.  Use [employer] (secure e-mail communication and access to your chart) to send a message or to make an appointment. Please ask how you can sign up for [employer].  Your care team's suggested websites for health information:   [email or web address] : Up to date and easily searchable information on multiple topics.   [email or web address] : [place] dept of heat, public health issues in [place], N1N1

## 2017-11-09 NOTE — LETTER
11/9/2017         RE: Tea Raygoza  [address]  [place] [address]        Dear Colleague,    Thank you for referring your patient, Tea Raygoza, to the [facility]. Please see a copy of my visit note below.    Chief Complaint:   Chief Complaint   Patient presents with     Hand Pain     Right dorsal wrist mass and hand numbness. Hand numbness started about 1 week ago and mass showed up about 3 days ago. Rt hand dominant. Went to  yesterday was given a brace for at night and told to take aleve.         Tea Raygoza is seen today in the [facility] for evaluation of right dorsal wrist ganglion cyst, numbness and tingling at the request of LENIN Ryder.     HPI: Tea Raygoza is a 31 year old female , right -hand dominant, who presents for evaluation and management of a right dorsal wrist mass, no known injury. Her symptoms started as numbness and tingling in the right hand and wrist 1 week ago. Numbness and tingling can occur in the fingers, hand, wrist, forearm, and into the elbow.  About 2-3 days ago, while sewing, had some numbness and tingling. She stopped and looked at her hand and noticed a mass had surfaced over the right dorsal wrist. She was seen in urgent care yesterday and was given a wrist brace for the night and was told to take Aleve. She has no pain today, rated a 0/10. Presents today with her .    She reports having no pain/discomfort around the wrist mass. She denies any other similar masses to her upper extremity or other extremities.     Changes in size: No   Changes in color: No   Tenderness of mass: No   Hot/cold sensitivity: No   Pain severity: 0/10  Pain quality: NA  Frequency of symptoms: frequently.  Night pain: No   Fevers, chills, night sweats: No   Aggravating factors: none.  Relieving factors: at rest.    Previous treatment: wrist brace, Aleve    Past medical history:  has a past medical  "history of NO ACTIVE PROBLEMS.   Patient Active Problem List    Diagnosis Date Noted     Carrier or suspected carrier of group B Streptococcus 05/28/2011     Priority: Medium     Plan PCN IAP.       Anemia of mother, complicating pregnancy, childbirth, or the puerperium, unspecified as to episode of care(648.20) 03/26/2011     Priority: Medium     Not immune to rubella 12/24/2010     Priority: Medium     Plan pp vac at hospital.  Do you wish to do the replacement in the background? yes         Encounter for supervision of other normal pregnancy 12/22/2010     Priority: Medium     History of fast labor.  Girl!  Diagnosis updated by automated process. Provider to review and confirm.       CARDIOVASCULAR SCREENING; LDL GOAL LESS THAN 160 10/31/2010     Priority: Medium       Past surgical history:  has a past surgical history that includes no history of surgery.     Medications:    No current outpatient prescriptions on file.        Allergies:     Allergies   Allergen Reactions     Nkda [No Known Drug Allergies]         Family History: family history is not on file.     Social History: .  reports that she has never smoked. She has never used smokeless tobacco. She reports that she does not drink alcohol or use illicit drugs.    Review of Systems:  ROS: 10 point ROS neg other than the symptoms noted above in the HPI and past medical history.    This document serves as a record of the services and decisions personally performed and made by Rommel Parikh MD. It was created on his behalf by Jeane Still, a trained medical scribe. The creation of this document is based the provider's statements to the medical scribe.    Scribe Jeane Still 4:08 PM 11/9/2017    Physical Exam  Resp 16  Ht 1.499 m (4' 11\")  Wt 64.4 kg (142 lb)  BMI 28.68 kg/m2  GENERAL APPEARANCE: healthy, alert, no distress.   SKIN: no suspicious lesions or rashes  RESPIRATORY: No increased work of breathing.  NEURO:     strength: normal,    thenar " fasiculations: negative    Thenar atrophy: none.    Sensation intact in  All digits but decreased in radial 3.5 right hand.   reflexes normal in upper extremities.   PSYCH:  mentation appears normal and affect normal, not anxious.    MUSCULOSKELETAL:    RIGHT HAND / WRIST EXAM:  Skin intact. No abnormal skin discoloration, erythema or ecchymosis.   Normal wear pattern, color and tone.  Sensations are intact of the small and ulnar aspect of the ring finger. Decreased sensations of the radial ring finger, long, index fingers and thumb.    Noticeable mass, ~1.5cm diameter, located over dorsal ulnar wrist just distal to the dorsal prominence of the distal ulna.   Mass does transiluminate with light.  Mass no tender to palpation. Semi-firm, fluctuant.  Negative Tinel's over mass.    No abnormal skin color or atrophic changes over mass.  No other observable or palpable masses of the fingers or palm.  No palpable triggering of fingers.   No observable or palpable cords or nodules of the fingers or palm.    There is no swelling in the wrist, other than the mass  There is no tenderness in the wrist, other than the mass  There is no ecchymosis.  There is no erythema of the surrounding skin.  There is no maceration of the skin.  There is no deformity in the area.  Intact extensors. No extensor lag.    Special tests wrist:    Tinel's positive,    Phalen's positive.   Flexion/compression test positive.    Intact sensation light touch median, radial, ulnar nerves of the hand  Intact sensation to the radial and ulnar digital nerves of the fingers, as well as the finger tips.  Intact epl fpl fdp edc wrist flexion/extension biceps/triceps deltoid  Brisk capillary refill to all fingers.   Palpable radial pulse, 2+.    LEFT HAND/FINGERS:    Skin intact. No abnormal skin discoloration, erythema or ecchymosis.   No nail pitting or clubbing.  Normal wear pattern, color and tone.  No observable or palpable masses of the fingers or palm or  wrist.  No palpable triggering of fingers.   No observable or palpable cords or nodules of the fingers or palm.    There is no swelling in the wrist, hand, forearm.  There is no tenderness in the wrist.  There is no ecchymosis.  There is no erythema of the surrounding skin.  There is no maceration of the skin.  There is no deformity in the area.  Intact extensors. No extensor lag.    Special tests wrist:    Tinel's mild,    Phalen's negative     Median nerve compression test mild.     Intact sensation light touch median, radial, ulnar nerves of the hand  Intact sensation to the radial and ulnar digital nerves of the fingers, as well as the finger tips.  Intact epl fpl fdp edc wrist flexion/extension biceps/triceps deltoid  Brisk capillary refill to all fingers.   Palpable radial pulse, 2+.      X-rays:  3 views right wrist from 11/9/2017 were reviewed in clinic today. On my review, no obvious fractures or deformities.    Assessment: 31 year old female with right dorsal wrist mass, likely ganglion cyst, also bilateral carpal tunnel syndrome (R>L). Symptoms are most likely not related to the cyst given its location.    Plan:   MASS:  Discussed with patient that the mass is consistent with ganglion cyst, a common, benign tumor of the upper extremity. Less likely another type of tumor or neoplasm. Treatment options include: observation if asymptomatic or minimal symptoms, activity modification, splint, aspiration with cortisone injection (risks of recurrence > 50%), or surgical excision (risk of recurrence < 5-10%). Risks and benefits of each discussed in detail.    * in particular, risks of surgery include, but not limited to: bleeding, infection, pain, scar, damage to adjacent structures (nerves, vessels, bone, cartilage, tendons, ligaments), temporary versus permanent nerve injury, failure to relieve symptoms, recurrence of symptoms or recurrence of the mass, stiffness, need for further surgery, risks of anesthesia,  blood clots, death.    * at this time, patient would like to proceed with non-operative treatment.  * no treatments at this time other than wrist brace with activities, night.  * return to clinic if mass becomes painful or increases in size. Consider aspiration versus surgical excision.      CARPAL TUNNEL SYNDROME:  * discussed with patient signs and symptoms consistent with carpal tunnel syndrome, R>L. Carpal tunnel syndrome is compression or pinching of the median nerve at the wrist, as it enters the hand. There are many different causes, and in most cases, multifactorial.    * An indepth discussion was had with her about the options for treatment, which included activity modification to avoid aggravating activities, taking breaks during activities that cause symptoms, stretching, NSAIDS to help decrease inflammation and swelling within the carpal tunnel, night splinting, corticosteroid injections, and carpal tunnel release.   * depending upon severity and duration of symptoms, nonoperative treatment is usually initiated, starting with least invasive modalities such as activity modification and a trial of night splints and NSAIDs.  * Cortisone injections are considered to decrease swelling and inflammation within the carpal tunnel and compression of the nerve.   * Lastly, carpal tunnel release should symptoms persist despite trial of nonoperative treatment, or in cases of severe carpal tunnel syndrome.  * at this time, will proceed with a splint; use splint previously given by another provider.  * return to clinic as needed.  * all patient's questions addressed and answered today.      The information in this document, created by a scribe for me, accurately reflects the services I personally performed and the decisions made by me. I have reviewed and approved this document for accuracy.       Rommel Parikh M.D., M.S.  Dept. of Orthopaedic Surgery  [facility]          Again, thank you for allowing  me to participate in the care of your patient.        Sincerely,        Rommel Parikh MD

## 2017-11-09 NOTE — NURSING NOTE
"Chief Complaint   Patient presents with     Hand Pain     Right dorsal wrist mass and hand numbness. Hand numbness started about 1 week ago and mass showed up about 3 days ago. Rt hand dominant. Went to  yesterday was given a brace for at night and told to take aleve.        Initial Resp 16  Ht 1.499 m (4' 11\")  Wt 64.4 kg (142 lb)  BMI 28.68 kg/m2 Estimated body mass index is 28.68 kg/(m^2) as calculated from the following:    Height as of this encounter: 1.499 m (4' 11\").    Weight as of this encounter: 64.4 kg (142 lb).  Medication Reconciliation: complete   Bong Ramos PA-C, CAQ (Ortho)  Supervising Physician: Rommel Parikh M.D., M.S.  Dept. of Orthopaedic Surgery  [facility]      "

## 2018-02-12 ENCOUNTER — ALLIED HEALTH/NURSE VISIT (OUTPATIENT)
Dept: NURSING | Facility: CLINIC | Age: 33
End: 2018-02-12
Payer: COMMERCIAL

## 2018-02-12 DIAGNOSIS — Z11.1 PPD SCREENING TEST: Primary | ICD-10-CM

## 2018-02-12 PROCEDURE — 99207 ZZC NO CHARGE NURSE ONLY: CPT

## 2018-02-12 PROCEDURE — 86580 TB INTRADERMAL TEST: CPT

## 2018-02-12 NOTE — MR AVS SNAPSHOT
After Visit Summary   2/12/2018    Rylan Raygoza    MRN: [id number]           Patient Information     Date Of Birth          1985        Visit Information        Provider Department      2/12/2018 1:00 PM BK ANCILLARY [facility]        Today's Diagnoses     PPD screening test    -  1       Follow-ups after your visit        Your next 10 appointments already scheduled     Feb 14, 2018  2:00 PM CST   Nurse Only with JA RN   [facility]    [address] [place] [address] [id number] [phone]              Who to contact     If you have questions or need follow up information about today's clinic visit or your schedule please contact [facility] directly at [phone].  Normal or non-critical lab and imaging results will be communicated to you by MyChart, letter or phone within 4 business days after the clinic has received the results. If you do not hear from us within 7 days, please contact the clinic through Sponduuhart or phone. If you have a critical or abnormal lab result, we will notify you by phone as soon as possible.  Submit refill requests through SGB or call your pharmacy and they will forward the refill request to us. Please allow 3 business days for your refill to be completed.          Additional Information About Your Visit        MyChart Information     SGB gives you secure access to your electronic health record. If you see a primary care provider, you can also send messages to your care team and make appointments. If you have questions, please call your primary care clinic.  If you do not have a primary care provider, please call [phone] and they will assist you.        Care EveryWhere ID     This is your Care EveryWhere ID. This could be used by other organizations to access your [place] medical records  [id number]         Blood Pressure from Last 3  Encounters:   11/08/17 110/74   03/16/17 113/77   03/11/16 96/68    Weight from Last 3 Encounters:   11/09/17 142 lb (64.4 kg)   11/08/17 141 lb 9.6 oz (64.2 kg)   03/16/17 142 lb (64.4 kg)              We Performed the Following     TB INTRADERMAL TEST     VACCINE ADMINISTRATION, INITIAL        Primary Care Provider Fax #    [facility] [phone] [address] [facility] [address]  [facility] [address]        Equal Access to Services     [facility]: Hadii aad ku hadasho Soomaali, waaxda luqadaha, qaybta kaalmada adeegyada, waxjonah latham . So [facility] [phone].    ATENCIÓN: Si habla español, tiene a poole disposición servicios gratuitos de asistencia lingüística. Llame al [phone].    We comply with applicable federal civil rights laws and [place] laws. We do not discriminate on the basis of race, color, national origin, age, disability, sex, sexual orientation, or gender identity.            Thank you!     Thank you for choosing [facility]  for your care. Our goal is always to provide you with excellent care. Hearing back from our patients is one way we can continue to improve our services. Please take a few minutes to complete the written survey that you may receive in the mail after your visit with us. Thank you!             Your Updated Medication List - Protect others around you: Learn how to safely use, store and throw away your medicines at www.disposemymeds.org.      Notice  As of 2/12/2018  1:22 PM    You have not been prescribed any medications.

## 2018-02-12 NOTE — PROGRESS NOTES

## 2018-02-14 ENCOUNTER — ALLIED HEALTH/NURSE VISIT (OUTPATIENT)
Dept: NURSING | Facility: CLINIC | Age: 33
End: 2018-02-14
Payer: COMMERCIAL

## 2018-02-14 DIAGNOSIS — Z11.1 SCREENING EXAMINATION FOR PULMONARY TUBERCULOSIS: Primary | ICD-10-CM

## 2018-02-14 LAB
PPDINDURATION: 0 MM (ref 0–5)
PPDREDNESS: 0 MM

## 2018-02-14 NOTE — MR AVS SNAPSHOT
After Visit Summary   2/14/2018    Rylan Raygoza    MRN: [id number]           Patient Information     Date Of Birth          1985        Visit Information        Provider Department      2/14/2018 2:00 PM BK RN [facility]        Today's Diagnoses     Screening examination for pulmonary tuberculosis    -  1      Care Instructions    Mantoux results: No induration.  No swelling.  No redness.    Negative    Cyndy Go RN              Follow-ups after your visit        Your next 10 appointments already scheduled     Feb 14, 2018  2:00 PM CST   Nurse Only with BK RN   [facility]    [address] [place] [id number] [phone]              Who to contact     If you have questions or need follow up information about today's clinic visit or your schedule please contact [facility] directly at [phone].  Normal or non-critical lab and imaging results will be communicated to you by MyChart, letter or phone within 4 business days after the clinic has received the results. If you do not hear from us within 7 days, please contact the clinic through Gentel Bioscienceshart or phone. If you have a critical or abnormal lab result, we will notify you by phone as soon as possible.  Submit refill requests through DLC or call your pharmacy and they will forward the refill request to us. Please allow 3 business days for your refill to be completed.          Additional Information About Your Visit        MyChart Information     DLC gives you secure access to your electronic health record. If you see a primary care provider, you can also send messages to your care team and make appointments. If you have questions, please call your primary care clinic.  If you do not have a primary care provider, please call [phone] and they will assist you.        Care EveryWhere ID     This is your Care  EveryWhere ID. This could be used by other organizations to access your [place] medical records  [id number]         Blood Pressure from Last 3 Encounters:   11/08/17 110/74   03/16/17 113/77   03/11/16 96/68    Weight from Last 3 Encounters:   11/09/17 142 lb (64.4 kg)   11/08/17 141 lb 9.6 oz (64.2 kg)   03/16/17 142 lb (64.4 kg)              Today, you had the following     No orders found for display       Primary Care Provider Fax #    [facility] [phone] [address] [facility] [address]  [facility] [address]        Equal Access to Services     [facility]IA : Hadii lashae vinson Sovalarie, waaxda luqadaha, qaybta kaalmada adeegyada, ginger goel. So [facility] [phone].    ATENCIÓN: Si habla español, tiene a poole disposición servicios gratuitos de asistencia lingüística. Llame al [phone].    We comply with applicable federal civil rights laws and [place] laws. We do not discriminate on the basis of race, color, national origin, age, disability, sex, sexual orientation, or gender identity.            Thank you!     Thank you for choosing [facility]  for your care. Our goal is always to provide you with excellent care. Hearing back from our patients is one way we can continue to improve our services. Please take a few minutes to complete the written survey that you may receive in the mail after your visit with us. Thank you!             Your Updated Medication List - Protect others around you: Learn how to safely use, store and throw away your medicines at www.disposemymeds.org.      Notice  As of 2/14/2018  1:56 PM    You have not been prescribed any medications.

## 2018-02-26 ENCOUNTER — ALLIED HEALTH/NURSE VISIT (OUTPATIENT)
Dept: NURSING | Facility: CLINIC | Age: 33
End: 2018-02-26
Payer: COMMERCIAL

## 2018-02-26 DIAGNOSIS — Z11.1 PPD SCREENING TEST: Primary | ICD-10-CM

## 2018-02-26 PROCEDURE — 86580 TB INTRADERMAL TEST: CPT

## 2018-02-26 PROCEDURE — 99207 ZZC NO CHARGE NURSE ONLY: CPT

## 2018-02-26 NOTE — MR AVS SNAPSHOT
After Visit Summary   2/26/2018    Rylan Raygoza    MRN: [id number]           Patient Information     Date Of Birth          1985        Visit Information        Provider Department      2/26/2018 11:40 AM BK ANCILLARY [facility]        Today's Diagnoses     PPD screening test    -  1       Follow-ups after your visit        Your next 10 appointments already scheduled     Feb 28, 2018 11:30 AM CST   Nurse Only with JA RN   [facility] ([facility])    [address] [place] [phone]              Who to contact     If you have questions or need follow up information about today's clinic visit or your schedule please contact [facility] directly at [phone].  Normal or non-critical lab and imaging results will be communicated to you by MyChart, letter or phone within 4 business days after the clinic has received the results. If you do not hear from us within 7 days, please contact the clinic through Easiaidhart or phone. If you have a critical or abnormal lab result, we will notify you by phone as soon as possible.  Submit refill requests through Winestyr or call your pharmacy and they will forward the refill request to us. Please allow 3 business days for your refill to be completed.          Additional Information About Your Visit        MyChart Information     Winestyr gives you secure access to your electronic health record. If you see a primary care provider, you can also send messages to your care team and make appointments. If you have questions, please call your primary care clinic.  If you do not have a primary care provider, please call [phone] and they will assist you.        Care EveryWhere ID     This is your Care EveryWhere ID. This could be used by other organizations to access your [place] medical records  [id number]         Blood Pressure from Last 3  Encounters:   11/08/17 110/74   03/16/17 113/77   03/11/16 96/68    Weight from Last 3 Encounters:   11/09/17 142 lb (64.4 kg)   11/08/17 141 lb 9.6 oz (64.2 kg)   03/16/17 142 lb (64.4 kg)              We Performed the Following     TB INTRADERMAL TEST     VACCINE ADMINISTRATION, INITIAL        Primary Care Provider Fax #    [facility] [phone] [address] [facility] [address]  [facility] [address]        Equal Access to Services     [facility]: Hadii aad ku hadasho Soomaali, waaxda luqadaha, qaybta kaalmada adeegyada, waxjonah latham . So [facility] [phone].    ATENCIÓN: Si habla español, tiene a poole disposición servicios gratuitos de asistencia lingüística. Llame al [phone].    We comply with applicable federal civil rights laws and [place] laws. We do not discriminate on the basis of race, color, national origin, age, disability, sex, sexual orientation, or gender identity.            Thank you!     Thank you for choosing [facility]  for your care. Our goal is always to provide you with excellent care. Hearing back from our patients is one way we can continue to improve our services. Please take a few minutes to complete the written survey that you may receive in the mail after your visit with us. Thank you!             Your Updated Medication List - Protect others around you: Learn how to safely use, store and throw away your medicines at www.disposemymeds.org.      Notice  As of 2/26/2018 11:52 AM    You have not been prescribed any medications.

## 2018-02-26 NOTE — PROGRESS NOTES
The patient is asked the following questions today and these are her answers:    -Have you had a mantoux administered in the past 30 days?    Yes  -Have you had a previous positive Mantoux.  No  -Have you received BCG in the past.  No  -Have you had a live vaccine  (MMR, Varicella, OPV, Yellow Fever) in the last 6 weeks.  No  -Have you had and active  viral or bacterial infection in the past 6 weeks.  No  -Have you received corticosteroids or immunosuppressive agents in the past 6 weeks.  No  -Have you been diagnosed with HIV?  No  -Do you have a maglinancy?  No   Negar Gomez

## 2018-02-28 ENCOUNTER — ALLIED HEALTH/NURSE VISIT (OUTPATIENT)
Dept: NURSING | Facility: CLINIC | Age: 33
End: 2018-02-28
Payer: COMMERCIAL

## 2018-02-28 DIAGNOSIS — Z11.1 SCREENING EXAMINATION FOR PULMONARY TUBERCULOSIS: Primary | ICD-10-CM

## 2018-02-28 LAB
PPDINDURATION: 0 MM (ref 0–5)
PPDREDNESS: 0 MM

## 2018-02-28 NOTE — PROGRESS NOTES
Karris, your test results were within normal limits. TB skin test was negative.    Please do not hesitate to call us at [phone] if you have any questions or concerns.    Thank you,    Caryl Parekh MD MPH

## 2018-02-28 NOTE — MR AVS SNAPSHOT
After Visit Summary   2/28/2018    Rylan Raygoza    MRN: [id number]           Patient Information     Date Of Birth          1985        Visit Information        Provider Department      2/28/2018 11:30 AM BK RN [facility]        Today's Diagnoses     Screening examination for pulmonary tuberculosis    -  1       Follow-ups after your visit        Who to contact     If you have questions or need follow up information about today's clinic visit or your schedule please contact [facility] directly at [phone].  Normal or non-critical lab and imaging results will be communicated to you by [place] [employer]art, letter or phone within 4 business days after the clinic has received the results. If you do not hear from us within 7 days, please contact the clinic through fg microtect or phone. If you have a critical or abnormal lab result, we will notify you by phone as soon as possible.  Submit refill requests through Brandlive or call your pharmacy and they will forward the refill request to us. Please allow 3 business days for your refill to be completed.          Additional Information About Your Visit        MyChart Information     Brandlive gives you secure access to your electronic health record. If you see a primary care provider, you can also send messages to your care team and make appointments. If you have questions, please call your primary care clinic.  If you do not have a primary care provider, please call [phone] and they will assist you.        Care EveryWhere ID     This is your Care EveryWhere ID. This could be used by other organizations to access your [place] medical records  [id number]         Blood Pressure from Last 3 Encounters:   11/08/17 110/74   03/16/17 113/77   03/11/16 96/68    Weight from Last 3 Encounters:   11/09/17 142 lb (64.4 kg)   11/08/17 141 lb 9.6 oz (64.2 kg)   03/16/17 142 lb (64.4 kg)              Today, you had the following      No orders found for display       Primary Care Provider Fax #    [facility] [phone] [address] [facility] [address]  [facility] [address]        Equal Access to Services     CHUY TAPIA : Hadii aad ku hadminervao Sooseasali, waaxda luqadaha, qaybta kaalmada adeegyada, ginger valdemarin hayaalee curtismac homacookienara goel. So [facility] [phone].    ATENCIÓN: Si habla español, tiene a poole disposición servicios gratuitos de asistencia lingüística. Llame al [phone].    We comply with applicable federal civil rights laws and [place] laws. We do not discriminate on the basis of race, color, national origin, age, disability, sex, sexual orientation, or gender identity.            Thank you!     Thank you for choosing [facility]  for your care. Our goal is always to provide you with excellent care. Hearing back from our patients is one way we can continue to improve our services. Please take a few minutes to complete the written survey that you may receive in the mail after your visit with us. Thank you!             Your Updated Medication List - Protect others around you: Learn how to safely use, store and throw away your medicines at www.disposemymeds.org.      Notice  As of 2/28/2018 12:01 PM    You have not been prescribed any medications.

## 2018-02-28 NOTE — NURSING NOTE
Mantoux results: Negative.  No induration.  No swelling.  No redness.      Arcelia Moy RN  [facility]

## 2018-07-20 ENCOUNTER — OFFICE VISIT (OUTPATIENT)
Dept: FAMILY MEDICINE | Facility: CLINIC | Age: 33
End: 2018-07-20
Payer: COMMERCIAL

## 2018-07-20 VITALS
TEMPERATURE: 98 F | HEIGHT: 59 IN | SYSTOLIC BLOOD PRESSURE: 118 MMHG | HEART RATE: 89 BPM | DIASTOLIC BLOOD PRESSURE: 79 MMHG | BODY MASS INDEX: 27.58 KG/M2 | WEIGHT: 136.8 LBS | OXYGEN SATURATION: 100 %

## 2018-07-20 DIAGNOSIS — R35.89 POLYURIA: ICD-10-CM

## 2018-07-20 DIAGNOSIS — N92.6 IRREGULAR MENSES: ICD-10-CM

## 2018-07-20 DIAGNOSIS — Z00.00 ROUTINE GENERAL MEDICAL EXAMINATION AT A HEALTH CARE FACILITY: Primary | ICD-10-CM

## 2018-07-20 DIAGNOSIS — R21 RASH OF GENITAL AREA: ICD-10-CM

## 2018-07-20 DIAGNOSIS — R22.9 LOCALIZED SUPERFICIAL SWELLING, MASS, OR LUMP: ICD-10-CM

## 2018-07-20 DIAGNOSIS — M67.431 GANGLION CYST OF WRIST, RIGHT: ICD-10-CM

## 2018-07-20 LAB
ALBUMIN SERPL-MCNC: 4 G/DL (ref 3.4–5)
ALBUMIN UR-MCNC: NEGATIVE MG/DL
ALP SERPL-CCNC: 60 U/L (ref 40–150)
ALT SERPL W P-5'-P-CCNC: 42 U/L (ref 0–50)
ANION GAP SERPL CALCULATED.3IONS-SCNC: 5 MMOL/L (ref 3–14)
APPEARANCE UR: CLEAR
AST SERPL W P-5'-P-CCNC: 17 U/L (ref 0–45)
BILIRUB SERPL-MCNC: 0.8 MG/DL (ref 0.2–1.3)
BILIRUB UR QL STRIP: NEGATIVE
BUN SERPL-MCNC: 15 MG/DL (ref 7–30)
CALCIUM SERPL-MCNC: 8.7 MG/DL (ref 8.5–10.1)
CHLORIDE SERPL-SCNC: 104 MMOL/L (ref 94–109)
CHOLEST SERPL-MCNC: 138 MG/DL
CO2 SERPL-SCNC: 30 MMOL/L (ref 20–32)
COLOR UR AUTO: YELLOW
CREAT SERPL-MCNC: 0.65 MG/DL (ref 0.52–1.04)
ERYTHROCYTE [DISTWIDTH] IN BLOOD BY AUTOMATED COUNT: 14.3 % (ref 10–15)
FERRITIN SERPL-MCNC: 37 NG/ML (ref 12–150)
GFR SERPL CREATININE-BSD FRML MDRD: >90 ML/MIN/1.7M2
GLUCOSE SERPL-MCNC: 92 MG/DL (ref 70–99)
GLUCOSE UR STRIP-MCNC: NEGATIVE MG/DL
HCT VFR BLD AUTO: 41.9 % (ref 35–47)
HDLC SERPL-MCNC: 39 MG/DL
HGB BLD-MCNC: 13.8 G/DL (ref 11.7–15.7)
HGB UR QL STRIP: NEGATIVE
KETONES UR STRIP-MCNC: NEGATIVE MG/DL
LDLC SERPL CALC-MCNC: 71 MG/DL
LEUKOCYTE ESTERASE UR QL STRIP: NEGATIVE
MCH RBC QN AUTO: 28.6 PG (ref 26.5–33)
MCHC RBC AUTO-ENTMCNC: 32.9 G/DL (ref 31.5–36.5)
MCV RBC AUTO: 87 FL (ref 78–100)
NITRATE UR QL: NEGATIVE
NONHDLC SERPL-MCNC: 99 MG/DL
PH UR STRIP: 5.5 PH (ref 5–7)
PLATELET # BLD AUTO: 284 10E9/L (ref 150–450)
POTASSIUM SERPL-SCNC: 4.1 MMOL/L (ref 3.4–5.3)
PROT SERPL-MCNC: 8.2 G/DL (ref 6.8–8.8)
RBC # BLD AUTO: 4.83 10E12/L (ref 3.8–5.2)
SODIUM SERPL-SCNC: 139 MMOL/L (ref 133–144)
SOURCE: NORMAL
SP GR UR STRIP: 1.02 (ref 1–1.03)
SPECIMEN SOURCE: NORMAL
TRIGL SERPL-MCNC: 141 MG/DL
TSH SERPL DL<=0.005 MIU/L-ACNC: 0.8 MU/L (ref 0.4–4)
UROBILINOGEN UR STRIP-ACNC: 0.2 EU/DL (ref 0.2–1)
WBC # BLD AUTO: 9.1 10E9/L (ref 4–11)
WET PREP SPEC: NORMAL

## 2018-07-20 PROCEDURE — 80061 LIPID PANEL: CPT | Performed by: PREVENTIVE MEDICINE

## 2018-07-20 PROCEDURE — 99395 PREV VISIT EST AGE 18-39: CPT | Performed by: PREVENTIVE MEDICINE

## 2018-07-20 PROCEDURE — 87210 SMEAR WET MOUNT SALINE/INK: CPT | Performed by: PREVENTIVE MEDICINE

## 2018-07-20 PROCEDURE — 99213 OFFICE O/P EST LOW 20 MIN: CPT | Mod: 25 | Performed by: PREVENTIVE MEDICINE

## 2018-07-20 PROCEDURE — 85027 COMPLETE CBC AUTOMATED: CPT | Performed by: PREVENTIVE MEDICINE

## 2018-07-20 PROCEDURE — 81003 URINALYSIS AUTO W/O SCOPE: CPT | Performed by: PREVENTIVE MEDICINE

## 2018-07-20 PROCEDURE — 87491 CHLMYD TRACH DNA AMP PROBE: CPT | Performed by: PREVENTIVE MEDICINE

## 2018-07-20 PROCEDURE — 84443 ASSAY THYROID STIM HORMONE: CPT | Performed by: PREVENTIVE MEDICINE

## 2018-07-20 PROCEDURE — 82728 ASSAY OF FERRITIN: CPT | Performed by: PREVENTIVE MEDICINE

## 2018-07-20 PROCEDURE — 36415 COLL VENOUS BLD VENIPUNCTURE: CPT | Performed by: PREVENTIVE MEDICINE

## 2018-07-20 PROCEDURE — 87591 N.GONORRHOEAE DNA AMP PROB: CPT | Performed by: PREVENTIVE MEDICINE

## 2018-07-20 PROCEDURE — 80053 COMPREHEN METABOLIC PANEL: CPT | Performed by: PREVENTIVE MEDICINE

## 2018-07-20 PROCEDURE — 87529 HSV DNA AMP PROBE: CPT | Performed by: PREVENTIVE MEDICINE

## 2018-07-20 PROCEDURE — 87529 HSV DNA AMP PROBE: CPT | Mod: 91 | Performed by: PREVENTIVE MEDICINE

## 2018-07-20 ASSESSMENT — PATIENT HEALTH QUESTIONNAIRE - PHQ9
SUM OF ALL RESPONSES TO PHQ QUESTIONS 1-9: 7
SUM OF ALL RESPONSES TO PHQ QUESTIONS 1-9: 7
10. IF YOU CHECKED OFF ANY PROBLEMS, HOW DIFFICULT HAVE THESE PROBLEMS MADE IT FOR YOU TO DO YOUR WORK, TAKE CARE OF THINGS AT HOME, OR GET ALONG WITH OTHER PEOPLE: SOMEWHAT DIFFICULT

## 2018-07-20 NOTE — PATIENT INSTRUCTIONS
At [facility], we strive to deliver an exceptional experience to you, every time we see you.  If you receive a survey in the mail, please send us back your thoughts. We really do value your feedback.    Based on your medical history, these are the current health maintenance/preventive care services that you are due for (some may have been done at this visit.)  Health Maintenance Due   Topic Date Due     PHQ-2 Q1 YR  03/16/2018       Suggested websites for health information:  Www.[facility].org : Up to date and easily searchable information on multiple topics.  Www.medlineplus.gov : medication info, interactive tutorials, watch real surgeries online  Www.familydoctor.org : good info from the Academy of Family Physicians  Www.cdc.gov : public health info, travel advisories, epidemics (H1N1)  Www.aap.org : children's health info, normal development, vaccinations  Www.health.[facility].[place].us : [place] dept of health, public health issues in [place], N1N1    Your care team:                            Family Medicine Internal Medicine   MD Zack Kamara MD Shantel Branch-Fleming, MD Katya Georgiev PA-C Megan Hill, APRN CNP    Rob Giron MD Pediatrics   Keith Richardson, PALuanaC  Christine Cordero, CNP MD Mila Roth APRN CNP   MD Flores Sabillon MD Deborah Mielke, MD Kim Thein, APRN CNP      Clinic hours: Monday - Thursday 7 am-7 pm; Fridays 7 am-5 pm.   Urgent care: Monday - Friday 11 am-9 pm; Saturday and Sunday 9 am-5 pm.  Pharmacy : Monday -Thursday 8 am-8 pm; Friday 8 am-6 pm; Saturday and Sunday 9 am-5 pm.     Clinic: [phone]   Pharmacy: [phone]      Preventive Health Recommendations  Female Ages 26 - 39  Yearly exam:   See your health care provider every year in order to    Review health changes.     Discuss preventive care.      Review your medicines if you your doctor has prescribed any.    Until age 30: Get a Pap test every three years (more  often if you have had an abnormal result).    After age 30: Talk to your doctor about whether you should have a Pap test every 3 years or have a Pap test with HPV screening every 5 years.   You do not need a Pap test if your uterus was removed (hysterectomy) and you have not had cancer.  You should be tested each year for STDs (sexually transmitted diseases), if you're at risk.   Talk to your provider about how often to have your cholesterol checked.  If you are at risk for diabetes, you should have a diabetes test (fasting glucose).  Shots: Get a flu shot each year. Get a tetanus shot every 10 years.   Nutrition:     Eat at least 5 servings of fruits and vegetables each day.    Eat whole-grain bread, whole-wheat pasta and brown rice instead of white grains and rice.    Get adequate Calcium and Vitamin D.     Lifestyle    Exercise at least 150 minutes a week (30 minutes a day, 5 days of the week). This will help you control your weight and prevent disease.    Limit alcohol to one drink per day.    No smoking.     Wear sunscreen to prevent skin cancer.    See your dentist every six months for an exam and cleaning.

## 2018-07-20 NOTE — PROGRESS NOTES
Rylan,     Basic blood count did not show anemia or infection.  Urine sample does not show any blood or infection.  Other labs are pending.     Please do not hesitate to call us at [phone] if you have any questions or concerns.    Thank you,    Caryl Parekh MD MPH

## 2018-07-20 NOTE — PROGRESS NOTES
Karrissa,     Electrolytes, glucose, kidney function, liver function, thyroid function and iron stores are normal    Cholesterol is at goal for you, however, HDL (good cholesterol) is low. Try and get 150 minutes of moderate physical activity per week.     Other labs are pending.     Please do not hesitate to call us at [phone] if you have any questions or concerns.    Thank you,    Caryl Parekh MD MPH

## 2018-07-20 NOTE — MR AVS SNAPSHOT
After Visit Summary   7/20/2018    Rylan Raygoza    MRN: [id number]           Patient Information     Date Of Birth          1985        Visit Information        Provider Department      7/20/2018 7:40 AM Caryl Parekh MD [facility]        Today's Diagnoses     Routine general medical examination at a health care facility    -  1    Ganglion cyst of wrist, right        Localized superficial swelling, mass, or lump        Polyuria        Irregular menses        Rash of genital area          Care Instructions    At [facility], we strive to deliver an exceptional experience to you, every time we see you.  If you receive a survey in the mail, please send us back your thoughts. We really do value your feedback.    Based on your medical history, these are the current health maintenance/preventive care services that you are due for (some may have been done at this visit.)  Health Maintenance Due   Topic Date Due     PHQ-2 Q1 YR  03/16/2018       Suggested websites for health information:  Www.CityNews.[employer] : Up to date and easily searchable information on multiple topics.  Www.medlineplus.gov : medication info, interactive tutorials, watch real surgeries online  Www.familydoctor.org : good info from the Academy of Family Physicians  Www.cdc.gov : public health info, travel advisories, epidemics (H1N1)  Www.aap.org : children's health info, normal development, vaccinations  Www.health.[facility].[place].us : [place] dept of health, public health issues in [place], N1N1    Your care team:                            Family Medicine Internal Medicine   MD Zack Kamara MD Shantel Branch-Fleming, MD Katya Georgiev PA-C Megan Hill, APRN LAINA Giron MD Pediatrics   MEÑO Barker, MD Mila Smith APRN CNP   MD Flores Sabillon MD Deborah Mielke, MD Kim Thein, APRN CNP      Clinic hours: Monday -  Thursday 7 am-7 pm; Fridays 7 am-5 pm.   Urgent care: Monday - Friday 11 am-9 pm; Saturday and Sunday 9 am-5 pm.  Pharmacy : Monday -Thursday 8 am-8 pm; Friday 8 am-6 pm; Saturday and Sunday 9 am-5 pm.     Clinic: [phone]   Pharmacy: [phone]      Preventive Health Recommendations  Female Ages 26 - 39  Yearly exam:   See your health care provider every year in order to    Review health changes.     Discuss preventive care.      Review your medicines if you your doctor has prescribed any.    Until age 30: Get a Pap test every three years (more often if you have had an abnormal result).    After age 30: Talk to your doctor about whether you should have a Pap test every 3 years or have a Pap test with HPV screening every 5 years.   You do not need a Pap test if your uterus was removed (hysterectomy) and you have not had cancer.  You should be tested each year for STDs (sexually transmitted diseases), if you're at risk.   Talk to your provider about how often to have your cholesterol checked.  If you are at risk for diabetes, you should have a diabetes test (fasting glucose).  Shots: Get a flu shot each year. Get a tetanus shot every 10 years.   Nutrition:     Eat at least 5 servings of fruits and vegetables each day.    Eat whole-grain bread, whole-wheat pasta and brown rice instead of white grains and rice.    Get adequate Calcium and Vitamin D.     Lifestyle    Exercise at least 150 minutes a week (30 minutes a day, 5 days of the week). This will help you control your weight and prevent disease.    Limit alcohol to one drink per day.    No smoking.     Wear sunscreen to prevent skin cancer.    See your dentist every six months for an exam and cleaning.            Follow-ups after your visit        Follow-up notes from your care team     Return in about 1 year (around 7/20/2019) for Routine Visit.      Who to contact     If you have questions or need follow up information about today's clinic visit or  "your schedule please contact [facility] directly at [phone].  Normal or non-critical lab and imaging results will be communicated to you by MyChart, letter or phone within 4 business days after the clinic has received the results. If you do not hear from us within 7 days, please contact the clinic through Corindushart or phone. If you have a critical or abnormal lab result, we will notify you by phone as soon as possible.  Submit refill requests through GuiaBolso or call your pharmacy and they will forward the refill request to us. Please allow 3 business days for your refill to be completed.          Additional Information About Your Visit        [email or web address] Information     GuiaBolso gives you secure access to your electronic health record. If you see a primary care provider, you can also send messages to your care team and make appointments. If you have questions, please call your primary care clinic.  If you do not have a primary care provider, please call [phone] and they will assist you.        Care EveryWhere ID     This is your Care EveryWhere ID. This could be used by other organizations to access your [place] medical records  [id number]        Your Vitals Were     Pulse Temperature Height Last Period Pulse Oximetry BMI (Body Mass Index)    89 98  F (36.7  C) (Oral) 4' 11\" (1.499 m) 06/27/2018 100% 27.63 kg/m2       Blood Pressure from Last 3 Encounters:   07/20/18 118/79   11/08/17 110/74   03/16/17 113/77    Weight from Last 3 Encounters:   07/20/18 136 lb 12.8 oz (62.1 kg)   11/09/17 142 lb (64.4 kg)   11/08/17 141 lb 9.6 oz (64.2 kg)              We Performed the Following     CBC with platelets     Chlamydia trachomatis PCR     Comprehensive metabolic panel     Ferritin     HSV 1 and 2 DNA by PCR     Lipid panel reflex to direct LDL Fasting     Neisseria gonorrhoeae PCR     TSH with free T4 reflex     UA reflex to Microscopic and Culture     Wet prep        Primary Care Provider Fax " #    [facility] [phone]       [address]  [facility] [address]        Equal Access to Services     CHUY TAPIA : Jorge L Reis, ivon cuenca, kacydaryl brownjuanda anumagnus, waxjonah valdemarin hayaalee curtismac christine yeison goel. So [facility] [phone].    ATENCIÓN: Si habla español, tiene a poole disposición servicios gratuitos de asistencia lingüística. Llame al [phone].    We comply with applicable federal civil rights laws and [place] laws. We do not discriminate on the basis of race, color, national origin, age, disability, sex, sexual orientation, or gender identity.            Thank you!     Thank you for choosing [facility]  for your care. Our goal is always to provide you with excellent care. Hearing back from our patients is one way we can continue to improve our services. Please take a few minutes to complete the written survey that you may receive in the mail after your visit with us. Thank you!             Your Updated Medication List - Protect others around you: Learn how to safely use, store and throw away your medicines at www.disposemymeds.org.      Notice  As of 7/20/2018  9:27 AM    You have not been prescribed any medications.

## 2018-07-20 NOTE — PROGRESS NOTES
SUBJECTIVE:   CC: Rylan Raygoza is an 32 year old woman who presents for preventive health visit.     Healthy Habits:  Do you get at least three servings of calcium containing foods daily (dairy, green leafy vegetables, etc.)? Answers for HPI/ROS submitted by the patient on 7/20/2018   Annual Exam:  Getting at least 3 servings of Calcium per day:: Yes  Bi-annual eye exam:: Yes  Dental care twice a year:: Yes  Sleep apnea or symptoms of sleep apnea:: None  Frequency of exercise:: 1 day/week  Taking medications regularly:: Not Applicable  Medication side effects:: Not applicable  Additional concerns today:: YES  PHQ-2 Score: 6  Duration of exercise:: 15-30 minutes  If you checked off any problems, how difficult have these problems made it for you to do your work, take care of things at home, or get along with other people?: Somewhat difficult  PHQ9 TOTAL SCORE: 7    Has several additional concerns that she would like to have addressed today.  Patient informed that evaluation of current symptoms including any lab work would not be covered as part of a preventive visit, and that they would be billed for these services. Patient expressed comprehension of this and would like to proceed.     Increased frequency of urination:  -for 2 months  -increasing  -feels incomplete emptying  -No blood  -Some nocturia  -No vaginal discharge  -has had bleeding between periods, slight spotting  -No abdominal pain     Lump on right wrist:  -has seen Orthopedics  -Now starting to develop tingling  -is right hand dominant  -No pain or focal weakness    Lump on left wrist:  -small lump  -No pain  -No skin changes  -No trauma    Rash on labia:  -Intermittent since 10/17  -happens before her periods and better after that  -Possible blister  -No past history of herpes  -Some itching  -feels sore and swollen    Some spotting between periods.    Today's PHQ-2 Score:   PHQ-2 ( 1999 [employer]) 7/20/2018 7/20/2018   Q1: Little interest or  pleasure in doing things 0 3   Q2: Feeling down, depressed or hopeless 0 3   PHQ-2 Score 0 6   Q1: Little interest or pleasure in doing things Nearly every day -   Q2: Feeling down, depressed or hopeless Nearly every day -   PHQ-2 Score 6 -       Abuse: Current or Past(Physical, Sexual or Emotional)- No  Do you feel safe in your environment - Yes    Social History   Substance Use Topics     Smoking status: Never Smoker     Smokeless tobacco: Never Used     Alcohol use No     If you drink alcohol do you typically have >3 drinks per day or >7 drinks per week? No                     Reviewed orders with patient.  Reviewed health maintenance and updated orders accordingly - Yes  Labs reviewed in EPIC  BP Readings from Last 3 Encounters:   07/20/18 118/79   11/08/17 110/74   03/16/17 113/77    Wt Readings from Last 3 Encounters:   07/20/18 136 lb 12.8 oz (62.1 kg)   11/09/17 142 lb (64.4 kg)   11/08/17 141 lb 9.6 oz (64.2 kg)                  Patient Active Problem List   Diagnosis     CARDIOVASCULAR SCREENING; LDL GOAL LESS THAN 160     Encounter for supervision of other normal pregnancy     Not immune to rubella     Anemia of mother, complicating pregnancy, childbirth, or the puerperium, unspecified as to episode of care(648.20)     Carrier or suspected carrier of group B Streptococcus     Past Surgical History:   Procedure Laterality Date     NO HISTORY OF SURGERY         Social History   Substance Use Topics     Smoking status: Never Smoker     Smokeless tobacco: Never Used     Alcohol use No     History reviewed. No pertinent family history.      No current outpatient prescriptions on file.     Allergies   Allergen Reactions     Nkda [No Known Drug Allergies]        Mammogram not appropriate for this patient based on age.    Pertinent mammograms are reviewed under the imaging tab.  History of abnormal Pap smear: NO - age 30-65 PAP every 5 years with negative HPV co-testing recommended  PAP / HPV 3/11/2016 1/25/2010  "  PAP NIL NIL     Reviewed and updated as needed this visit by clinical staff  Tobacco  Allergies  Meds  Med Hx  Surg Hx  Fam Hx  Soc Hx        Reviewed and updated as needed this visit by Provider  Allergies  Meds        Past Medical History:   Diagnosis Date     NO ACTIVE PROBLEMS       Past Surgical History:   Procedure Laterality Date     NO HISTORY OF SURGERY         ROS:  CONSTITUTIONAL: NEGATIVE for fever, chills, change in weight  EYES: NEGATIVE for vision changes or irritation  ENT: NEGATIVE for ear, mouth and throat problems  RESP: NEGATIVE for significant cough or SOB  BREAST: NEGATIVE for masses, tenderness or discharge  CV: NEGATIVE for chest pain, palpitations or peripheral edema  GI: NEGATIVE for nausea, abdominal pain, heartburn, or change in bowel habits  NEURO: NEGATIVE for weakness, dizziness or paresthesias  ENDOCRINE: NEGATIVE for temperature intolerance, skin/hair changes  HEME/ALLERGY/IMMUNE: NEGATIVE for bleeding problems  PSYCHIATRIC: NEGATIVE for changes in mood or affect    OBJECTIVE:   /79 (BP Location: Left arm, Patient Position: Chair, Cuff Size: Adult Regular)  Pulse 89  Temp 98  F (36.7  C) (Oral)  Ht 4' 11\" (1.499 m)  Wt 136 lb 12.8 oz (62.1 kg)  LMP 06/27/2018  SpO2 100%  BMI 27.63 kg/m2  EXAM:  GENERAL: healthy, alert and no distress  EYES: Eyes grossly normal to inspection, PERRL and conjunctivae and sclerae normal  HENT:  nose and mouth without ulcers or lesions  NECK: no adenopathy, no asymmetry, masses  RESP: lungs clear to auscultation - no rales, rhonchi or wheezes  BREAST: normal without masses, tenderness or nipple discharge and no palpable axillary masses or adenopathy  CV: regular rate and rhythm, normal S1 S2, no S3 or S4, no murmur, click or rub, no peripheral edema and peripheral pulses strong  ABDOMEN: soft, nontender, no hepatosplenomegaly, no masses and bowel sounds normal   (female): normal female external genitalia, normal urethral meatus, " vaginal mucosa pink, moist, slight hyperplasia on labia  MS: no gross musculoskeletal defects noted, no edema  SKIN: no suspicious lesions or rashes  NEURO: Normal strength and tone, mentation intact and speech normal  PSYCH: mentation appears normal, affect normal/bright  LYMPH: no cervical, supraclavicular, axillary adenopathy  Right wrist: ganglion cyst on dorsum  Left wrist: Tiny subcutaneous nodule,mobile, palpated along medial aspect of the wrist       Diagnostic Test Results:  Results for orders placed or performed in visit on 07/20/18 (from the past 24 hour(s))   Wet prep   Result Value Ref Range    Specimen Description Genital     Wet Prep No Trichomonas seen     Wet Prep No clue cells seen     Wet Prep No yeast seen    CBC with platelets   Result Value Ref Range    WBC 9.1 4.0 - 11.0 10e9/L    RBC Count 4.83 3.8 - 5.2 10e12/L    Hemoglobin 13.8 11.7 - 15.7 g/dL    Hematocrit 41.9 35.0 - 47.0 %    MCV 87 78 - 100 fl    MCH 28.6 26.5 - 33.0 pg    MCHC 32.9 31.5 - 36.5 g/dL    RDW 14.3 10.0 - 15.0 %    Platelet Count 284 150 - 450 10e9/L   UA reflex to Microscopic and Culture   Result Value Ref Range    Color Urine Yellow     Appearance Urine Clear     Glucose Urine Negative NEG^Negative mg/dL    Bilirubin Urine Negative NEG^Negative    Ketones Urine Negative NEG^Negative mg/dL    Specific Gravity Urine 1.020 1.003 - 1.035    Blood Urine Negative NEG^Negative    pH Urine 5.5 5.0 - 7.0 pH    Protein Albumin Urine Negative NEG^Negative mg/dL    Urobilinogen Urine 0.2 0.2 - 1.0 EU/dL    Nitrite Urine Negative NEG^Negative    Leukocyte Esterase Urine Negative NEG^Negative    Source Midstream Urine        ASSESSMENT/PLAN:   Rylan was seen today for physical.    Diagnoses and all orders for this visit:    Routine general medical examination at a health care facility  -     Lipid panel reflex to direct LDL Fasting  -USPSTF guidelines reviewed    Ganglion cyst of wrist, right  -Follow up with  "Orthopedics  Discussed with patient that the mass is consistent with ganglion cyst, a common, benign tumor of the upper extremity. Less likely another type of tumor or neoplasm. Treatment options include: observation if asymptomatic or minimal symptoms, activity modification, splint, aspiration with cortisone injection (risks of recurrence > 50%), or surgical excision (risk of recurrence < 5-10%).     Localized superficial swelling, mass, or lump  -benign in nature  -Could be the start of another ganglion cyst     Polyuria  -     UA reflex to Microscopic and Culture  -     Wet prep  -     Neisseria gonorrhoeae PCR  -     Chlamydia trachomatis PCR    Irregular menses  -     TSH with free T4 reflex  -     CBC with platelets  -     Comprehensive metabolic panel  -     Ferritin  -Await labs   -If labs normal but symptoms persist then refer to GYN     Rash of genital area  -     HSV 1 and 2 DNA by PCR        COUNSELING:   Reviewed preventive health counseling, as reflected in patient instructions       Regular exercise       Healthy diet/nutrition    BP Readings from Last 1 Encounters:   07/20/18 118/79     Estimated body mass index is 27.63 kg/(m^2) as calculated from the following:    Height as of this encounter: 4' 11\" (1.499 m).    Weight as of this encounter: 136 lb 12.8 oz (62.1 kg).      Weight management plan: Discussed healthy diet and exercise guidelines and patient will follow up in 12 months in clinic to re-evaluate.     reports that she has never smoked. She has never used smokeless tobacco.      Counseling Resources:  ATP IV Guidelines  Pooled Cohorts Equation Calculator  Breast Cancer Risk Calculator  FRAX Risk Assessment  ICSI Preventive Guidelines  Dietary Guidelines for Americans, 2010  USDA's MyPlate  ASA Prophylaxis  Lung CA Screening    Caryl Parekh MD MPH    [facility]  "

## 2018-07-21 LAB
HSV1 DNA SPEC QL NAA+PROBE: NEGATIVE
HSV2 DNA SPEC QL NAA+PROBE: NEGATIVE
SPECIMEN SOURCE: NORMAL

## 2018-07-21 ASSESSMENT — PATIENT HEALTH QUESTIONNAIRE - PHQ9: SUM OF ALL RESPONSES TO PHQ QUESTIONS 1-9: 7

## 2018-07-22 LAB
C TRACH DNA SPEC QL NAA+PROBE: NEGATIVE
N GONORRHOEA DNA SPEC QL NAA+PROBE: NEGATIVE
SPECIMEN SOURCE: NORMAL
SPECIMEN SOURCE: NORMAL

## 2018-07-24 NOTE — PROGRESS NOTES
Rylan,     Tests for Gonorrhea, Chlamydia and herpes culture were negative.     Please do not hesitate to call us at [phone] if you have any questions or concerns.    Thank you,    Caryl Parekh MD MPH

## 2018-09-17 ENCOUNTER — RADIANT APPOINTMENT (OUTPATIENT)
Dept: GENERAL RADIOLOGY | Facility: CLINIC | Age: 33
End: 2018-09-17
Attending: PEDIATRICS
Payer: COMMERCIAL

## 2018-09-17 ENCOUNTER — OFFICE VISIT (OUTPATIENT)
Dept: ORTHOPEDICS | Facility: CLINIC | Age: 33
End: 2018-09-17
Payer: COMMERCIAL

## 2018-09-17 VITALS
HEIGHT: 59 IN | WEIGHT: 138 LBS | SYSTOLIC BLOOD PRESSURE: 120 MMHG | DIASTOLIC BLOOD PRESSURE: 84 MMHG | BODY MASS INDEX: 27.82 KG/M2

## 2018-09-17 DIAGNOSIS — M65.4 RADIAL STYLOID TENOSYNOVITIS: Primary | ICD-10-CM

## 2018-09-17 DIAGNOSIS — M67.431 GANGLION CYST OF WRIST, RIGHT: ICD-10-CM

## 2018-09-17 DIAGNOSIS — M25.532 LEFT WRIST PAIN: ICD-10-CM

## 2018-09-17 PROCEDURE — 99203 OFFICE O/P NEW LOW 30 MIN: CPT | Performed by: PEDIATRICS

## 2018-09-17 PROCEDURE — 73110 X-RAY EXAM OF WRIST: CPT | Mod: LT

## 2018-09-17 NOTE — LETTER
9/17/2018         RE: Rylan Raygoza  [address]  [place] [address]        Dear Colleague,    Thank you for referring your patient, Rylan Raygoza, to the [facility]. Please see a copy of my visit note below.    Sports Medicine Clinic Visit    PCP: Physicians, Marni Santos    Rylan Raygoza is a 32 year old female who is seen  as a self referral presenting with bilateral wrist pain and swelling    Injury: She reports bilateral masses in wrist, pain in left wrist. She has an observable mass on the right dorsal wrist, has previously seen Dr. Parikh for this.  She has also noted a smaller palpable mass along the radial aspect of the left wrist. Both masses are painful to the touch.  No new injury    Location of Pain: bilateral wrist  Duration of Pain: 1 year(s)  Rating of Pain at worst: 8/10  Rating of Pain Currently: 3/10  Symptoms are better with: Tylenol and Rest  Symptoms are worse with: extension, flexion and hand and wrist over use  Additional Features:   Positive: swelling, paresthesias, numbness and weakness   Negative: bruising, popping, grinding, catching, locking and instability  Other evaluation and/or treatments so far consists of: Tylenol and Rest  Prior History of related problems: nothing    Social History: unemployed. Previous     Review of Systems  Skin: no bruising, yes swelling  Musculoskeletal: as above  Neurologic: no numbness, paresthesias  Remainder of review of systems is negative including constitutional, CV, pulmonary, GI, except as noted in HPI or medical history.    Patient's current problem list, past medical and surgical history, and family history were reviewed.    Patient Active Problem List   Diagnosis     CARDIOVASCULAR SCREENING; LDL GOAL LESS THAN 160     Encounter for supervision of other normal pregnancy     Not immune to rubella     Anemia of mother, complicating pregnancy, childbirth, or the  "puerperium, unspecified as to episode of care(648.20)     Carrier or suspected carrier of group B Streptococcus     Past Medical History:   Diagnosis Date     NO ACTIVE PROBLEMS      Past Surgical History:   Procedure Laterality Date     NO HISTORY OF SURGERY       No family history on file.      Objective  /84 (BP Location: Left arm, Patient Position: Chair, Cuff Size: Adult Regular)  Ht 4' 11\" (1.499 m)  Wt 138 lb (62.6 kg)  BMI 27.87 kg/m2    GENERAL APPEARANCE: healthy, alert and no distress   GAIT: NORMAL  SKIN: no suspicious lesions or rashes  HEENT: Sclera clear, anicteric  CV: good peripheral pulses  RESP: Breathing not labored  NEURO: Normal strength and tone, mentation intact and speech normal  PSYCH:  mentation appears normal and affect normal/bright    Bilateral Wrist and Hand exam    Inspection:       Swelling: right dorsal ganglion cyst       No mass left wrist    Tender:       Over right dorsal ganglion cyst, left radial styloid tendons    Non Tender:       Remainder of the Wrist and Hand bilateral    ROM:       Full and symmetric active and passive range of motion of the forearm, wrist and digits bilateral    Strength:       5/5 strength in the muscles of the hand, wrist and forearm bilateral    Special Tests:        positive (+) Finkelstein's maneuver left    Neurovascular:       2+ radial pulses bilaterally with brisk capillary refill and      normal sensation to light touch in the radial, median and ulnar nerve distributions      Radiology  I ordered, visualized and reviewed these images with the patient  Xr Wrist Left G/e 3 Views  Result Date: 9/18/2018  LEFT WRIST THREE OR MORE VIEWS 9/17/2018 6:54 PM HISTORY: Left wrist pain. COMPARISON: None.   IMPRESSION: No bony or soft tissue abnormality. DEBBIE HOWELL MD    I visualized and reviewed these images with the patient  Results for orders placed or performed in visit on 11/09/17   XR Wrist Right G/E 3 Views    Narrative    XR WRIST RT " G/E 3 VW 11/9/2017 4:19 PM     HISTORY: ; Mass of wrist, right      Impression    IMPRESSION: The wrist appears within normal limits. No soft tissue  calcification or radiopaque foreign body is seen.    JESUS ALBERTO RUIZ MD       Assessment:  1. Radial styloid tenosynovitis    2. Ganglion cyst of wrist, right      Left wrist radial styloid tenosynovitis.  I think patient's palpable lump is more tendon swelling.  We discussed the following treatment options: symptom treatment, activity modification/rest, injection, rehab and referral. Following discussion, plan: will start with bracing and consider other options pending clinical course.    Right wrist ganglion cyst.  Discussed monitoring, US guided procedure, surgical referral.  Patient would like to consider US guided procedure.    Plan:  - Today's Plan of Care:  US guided Ganglion procedure of right wrist with Dr Moises Tamez.   Medical equipment: Thumb spica brace left    -We also discussed other future treatment options:  Referral to Hand surgeon, Rehab: Occupational Therapy or Steroid injection of left wrist    Follow Up: as needed    Concerning signs and symptoms were reviewed.  The patient expressed understanding of this management plan and all questions were answered at this time.    Ursula Travis MD CAQ  Primary Care Sports Medicine  [facility]    Again, thank you for allowing me to participate in the care of your patient.        Sincerely,        Ursula Travis MD

## 2018-09-17 NOTE — PROGRESS NOTES
Sports Medicine Clinic Visit    PCP: Physicians, Marni Santos    Rylan Raygoza is a 32 year old female who is seen  as a self referral presenting with bilateral wrist pain and swelling    Injury: She reports bilateral masses in wrist, pain in left wrist. She has an observable mass on the right dorsal wrist, has previously seen Dr. Parikh for this.  She has also noted a smaller palpable mass along the radial aspect of the left wrist. Both masses are painful to the touch.  No new injury    Location of Pain: bilateral wrist  Duration of Pain: 1 year(s)  Rating of Pain at worst: 8/10  Rating of Pain Currently: 3/10  Symptoms are better with: Tylenol and Rest  Symptoms are worse with: extension, flexion and hand and wrist over use  Additional Features:   Positive: swelling, paresthesias, numbness and weakness   Negative: bruising, popping, grinding, catching, locking and instability  Other evaluation and/or treatments so far consists of: Tylenol and Rest  Prior History of related problems: nothing    Social History: unemployed. Previous     Review of Systems  Skin: no bruising, yes swelling  Musculoskeletal: as above  Neurologic: no numbness, paresthesias  Remainder of review of systems is negative including constitutional, CV, pulmonary, GI, except as noted in HPI or medical history.    Patient's current problem list, past medical and surgical history, and family history were reviewed.    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      Objective  /84 (BP Location: Left arm, Patient Position: Chair, Cuff Size: Adult Regular)  Ht 4' 11\" (1.499 m)  Wt 138 lb (62.6 kg)  BMI 27.87 kg/m2    GENERAL APPEARANCE: healthy, alert and no distress   GAIT: NORMAL  SKIN: no suspicious lesions or rashes  HEENT: Sclera clear, anicteric  CV: good peripheral pulses  RESP: Breathing not labored  NEURO: Normal strength and tone, mentation intact and speech normal  PSYCH:  mentation appears normal and affect normal/bright    Bilateral Wrist and Hand exam    Inspection:       Swelling: right dorsal ganglion cyst       No mass left wrist    Tender:       Over right dorsal ganglion cyst, left radial styloid tendons    Non Tender:       Remainder of the Wrist and Hand bilateral    ROM:       Full and symmetric active and passive range of motion of the forearm, wrist and digits bilateral    Strength:       5/5 strength in the muscles of the hand, wrist and forearm bilateral    Special Tests:        positive (+) Finkelstein's maneuver left    Neurovascular:       2+ radial pulses bilaterally with brisk capillary refill and      normal sensation to light touch in the radial, median and ulnar nerve distributions      Radiology  I ordered, visualized and reviewed these images with the patient  Xr Wrist Left G/e 3 Views  Result Date: 9/18/2018  LEFT WRIST THREE OR MORE VIEWS 9/17/2018 6:54 PM HISTORY: Left wrist pain. COMPARISON: None.   IMPRESSION: No bony or soft tissue abnormality. DEBBIE HOWLEL MD    I visualized and reviewed these images with the patient  Results for orders placed or performed in visit on 11/09/17   XR Wrist Right G/E 3 Views    Narrative    XR WRIST RT G/E 3 VW 11/9/2017 4:19 PM     HISTORY: ; Mass of wrist, right      Impression    IMPRESSION: The wrist appears within normal limits. No soft tissue  calcification or radiopaque foreign body is seen.    JESUS ALBERTO RUIZ MD       Assessment:  1. Radial styloid tenosynovitis  "   2. Ganglion cyst of wrist, right      Left wrist radial styloid tenosynovitis.  I think patient's palpable lump is more tendon swelling.  We discussed the following treatment options: symptom treatment, activity modification/rest, injection, rehab and referral. Following discussion, plan: will start with bracing and consider other options pending clinical course.    Right wrist ganglion cyst.  Discussed monitoring, US guided procedure, surgical referral.  Patient would like to consider US guided procedure.    Plan:  - Today's Plan of Care:  US guided Ganglion procedure of right wrist with Dr Moises Tamez.   Medical equipment: Thumb spica brace left    -We also discussed other future treatment options:  Referral to Hand surgeon, Rehab: Occupational Therapy or Steroid injection of left wrist    Follow Up: as needed    Concerning signs and symptoms were reviewed.  The patient expressed understanding of this management plan and all questions were answered at this time.    Ursula Travis MD CAQ  Primary Care Sports Medicine  [facility]

## 2018-09-17 NOTE — MR AVS SNAPSHOT
After Visit Summary   9/17/2018    Rylan Raygoza    MRN: [id number]           Patient Information     Date Of Birth          1985        Visit Information        Provider Department      9/17/2018 6:40 PM Ursula Travis MD [facility]        Today's Diagnoses     Radial styloid tenosynovitis    -  1    Ganglion cyst of wrist, right          Care Instructions        Plan:  - Today's Plan of Care:  US guided Ganglion procedure of right wrist with Dr Moises Tamez.   Medical equipment: Thumb spica brace left    -We also discussed other future treatment options:  Referral to Hand surgeon, Rehab: Occupational Therapy or Steroid injection of left wrist    Follow Up: as needed    If you have any further questions for your physician or physician s care team you can call [phone] and use option 3 to leave a voice message. Calls received during business hours will be returned same day.              Follow-ups after your visit        Additional Services     ORTHO  REFERRAL       [facility] is referring you to the Orthopedic  Services at [facility].       The  Representative will assist you in the coordination of your Orthopedic and Musculoskeletal Care as prescribed by your physician.    The  Representative will call you within 1 business day to help schedule your appointment, or you may contact the  Representative at:    All areas ~ [phone]     Type of Referral : Non Surgical - US guided Ganglion procedure of right wrist with Dr Moises Tamez      Timeframe requested: Routine    Coverage of these services is subject to the terms and limitations of your health insurance plan.  Please call member services at your health plan with any benefit or coverage questions.      If X-rays, CT or MRI's have been performed, please contact the facility where they were done to arrange for pick  "up, prior to your scheduled appointment.  Please bring this referral request to your appointment and present it to your specialist.                  Who to contact     If you have questions or need follow up information about today's clinic visit or your schedule please contact [facility] directly at [phone].  Normal or non-critical lab and imaging results will be communicated to you by MyChart, letter or phone within 4 business days after the clinic has received the results. If you do not hear from us within 7 days, please contact the clinic through Nse Industry[employer] or phone. If you have a critical or abnormal lab result, we will notify you by phone as soon as possible.  Submit refill requests through [employer] or call your pharmacy and they will forward the refill request to us. Please allow 3 business days for your refill to be completed.          Additional Information About Your Visit        MyChart Information     [employer] gives you secure access to your electronic health record. If you see a primary care provider, you can also send messages to your care team and make appointments. If you have questions, please call your primary care clinic.  If you do not have a primary care provider, please call [phone] and they will assist you.        Care EveryWhere ID     This is your Care EveryWhere ID. This could be used by other organizations to access your [facility] medical records  [id number]        Your Vitals Were     Height BMI (Body Mass Index)                4' 11\" (1.499 m) 27.87 kg/m2           Blood Pressure from Last 3 Encounters:   09/17/18 120/84   07/20/18 118/79   11/08/17 110/74    Weight from Last 3 Encounters:   09/17/18 138 lb (62.6 kg)   07/20/18 136 lb 12.8 oz (62.1 kg)   11/09/17 142 lb (64.4 kg)              We Performed the Following     ORTHO  REFERRAL        Primary Care Provider Fax #    [facility] [phone] [address]  [facility] [address]        Equal Access to Services     MAYRAHOMERO WILLIE : Hadii lashae Reis, ivon cuenca, zeeshan dwyer, ginger goel. So [facility] [phone].    ATENCIÓN: Si habla español, tiene a poole disposición servicios gratuitos de asistencia lingüística. Llame al [phone].    We comply with applicable federal civil rights laws and [place] laws. We do not discriminate on the basis of race, color, national origin, age, disability, sex, sexual orientation, or gender identity.            Thank you!     Thank you for choosing [facility] [place]  for your care. Our goal is always to provide you with excellent care. Hearing back from our patients is one way we can continue to improve our services. Please take a few minutes to complete the written survey that you may receive in the mail after your visit with us. Thank you!             Your Updated Medication List - Protect others around you: Learn how to safely use, store and throw away your medicines at www.disposemymeds.org.      Notice  As of 9/17/2018  7:14 PM    You have not been prescribed any medications.

## 2018-09-18 NOTE — PATIENT INSTRUCTIONS
Plan:  - Today's Plan of Care:  US guided Ganglion procedure of right wrist with Dr Moises Tamez.   Medical equipment: Thumb spica brace left    -We also discussed other future treatment options:  Referral to Hand surgeon, Rehab: Occupational Therapy or Steroid injection of left wrist    Follow Up: as needed    If you have any further questions for your physician or physician s care team you can call [phone] and use option 3 to leave a voice message. Calls received during business hours will be returned same day.

## 2018-09-28 ENCOUNTER — OFFICE VISIT (OUTPATIENT)
Dept: ORTHOPEDICS | Facility: CLINIC | Age: 33
End: 2018-09-28
Payer: COMMERCIAL

## 2018-09-28 VITALS
BODY MASS INDEX: 27.82 KG/M2 | WEIGHT: 138 LBS | SYSTOLIC BLOOD PRESSURE: 119 MMHG | DIASTOLIC BLOOD PRESSURE: 80 MMHG | HEIGHT: 59 IN

## 2018-09-28 DIAGNOSIS — M67.431 GANGLION CYST OF WRIST, RIGHT: Primary | ICD-10-CM

## 2018-09-28 PROCEDURE — 20612 ASPIRATE/INJ GANGLION CYST: CPT | Mod: RT | Performed by: FAMILY MEDICINE

## 2018-09-28 PROCEDURE — 76942 ECHO GUIDE FOR BIOPSY: CPT | Performed by: FAMILY MEDICINE

## 2018-09-28 RX ORDER — TRIAMCINOLONE ACETONIDE 40 MG/ML
20 INJECTION, SUSPENSION INTRA-ARTICULAR; INTRAMUSCULAR ONCE
Qty: 0.5 ML | Refills: 0 | OUTPATIENT
Start: 2018-09-28 | End: 2019-02-27

## 2018-09-28 NOTE — PROGRESS NOTES
Rylan Raygoza  :  1985  DOS: 2018  MRN: [id number]    Sports Medicine Clinic Procedure    Ultrasound Guided Injection and Aspiration of Right Ganglion Cyst of wrist    Clinical History: recalcitrant cystic swelling over ulnar/dorsal wrist  Diagnosis:   1. Ganglion cyst of wrist, right      Referring Physician: Dr Ursula Travis MD  Technique:  The risks of the procedure were explained to the patient.  The patient was evaluated with a [employer] ultrasound machine using a 12 MHz linear probe.   There was ultrasound identification of the ganglion cyst in both short and long axis.  Associated vessels were identified. 1ml of 1% lidocaine was used for local anesthesia. The Right wrist was prepped and draped in a sterile manner.    A 1.5 inch 18 gauge needle was placed under ultrasound guidance in short axis view into the ganglion cyst.  2 ml's of gel-like fluid was aspirated. A mixture of 1 ml of 0.2% ropivacaine and 1 ml kenalog (40mg/ml) was then injected without difficulty.  The needle was removed and there was good hemostasis without complications.  There was ultrasound documentation of needle placement and injection.     Impression:  Successful aspiration and injection of the dorsal wrist ganglion cyst    Plan:  Follow up prn based on clinical progress  Expectations and goals of CSI reviewed  Often 2-3 days for steroid effect, and can take up to two weeks for maximum effect  We discussed modified progressive pain-free activity as tolerated  Do not overuse in first two weeks if feeling better due to concern for vulnerability while steroid is working  Supportive care reviewed  All questions were answered today  Contact us with additional questions or concerns  Signs and sx of concern reviewed      Moises Tamez DO, CARAFA  Primary Care Sports Medicine  [facility]

## 2018-09-28 NOTE — LETTER
2018         RE: Rylan Raygoza  [address]  [place] [address]        Dear Colleague,    Thank you for referring your patient, Rylan Raygoza, to the [facility]. Please see a copy of my visit note below.    Rylan Raygoza  :  1985  DOS: 2018  MRN: [id number]    Sports Medicine Clinic Procedure    Ultrasound Guided Injection and Aspiration of Right Ganglion Cyst of wrist    Clinical History: recalcitrant cystic swelling over ulnar/dorsal wrist  Diagnosis:   1. Ganglion cyst of wrist, right      Referring Physician: Dr Ursula Travis MD  Technique:  The risks of the procedure were explained to the patient.  The patient was evaluated with a [employer] ultrasound machine using a 12 MHz linear probe.   There was ultrasound identification of the ganglion cyst in both short and long axis.  Associated vessels were identified. 1ml of 1% lidocaine was used for local anesthesia. The Right wrist was prepped and draped in a sterile manner.    A 1.5 inch 18 gauge needle was placed under ultrasound guidance in short axis view into the ganglion cyst.  2 ml's of gel-like fluid was aspirated. A mixture of 1 ml of 0.2% ropivacaine and 1 ml kenalog (40mg/ml) was then injected without difficulty.  The needle was removed and there was good hemostasis without complications.  There was ultrasound documentation of needle placement and injection.     Impression:  Successful aspiration and injection of the dorsal wrist ganglion cyst    Plan:  Follow up prn based on clinical progress  Expectations and goals of CSI reviewed  Often 2-3 days for steroid effect, and can take up to two weeks for maximum effect  We discussed modified progressive pain-free activity as tolerated  Do not overuse in first two weeks if feeling better due to concern for vulnerability while steroid is working  Supportive care reviewed  All questions were answered today  Contact  us with additional questions or concerns  Signs and sx of concern reviewed      Moises Tamez DO, CARAFA  Primary Care Sports Medicine  [facility]           Again, thank you for allowing me to participate in the care of your patient.        Sincerely,        Moises Tamez DO

## 2018-09-28 NOTE — MR AVS SNAPSHOT
"              After Visit Summary   9/28/2018    Rylan Raygoza    MRN: [id number]           Patient Information     Date Of Birth          1985        Visit Information        Provider Department      9/28/2018 1:40 PM Moises Tamez,  [facility]        Today's Diagnoses     Ganglion cyst of wrist, right    -  1       Follow-ups after your visit        Who to contact     If you have questions or need follow up information about today's clinic visit or your schedule please contact [facility] directly at [phone].  Normal or non-critical lab and imaging results will be communicated to you by Couplehart, letter or phone within 4 business days after the clinic has received the results. If you do not hear from us within 7 days, please contact the clinic through [employer] or phone. If you have a critical or abnormal lab result, we will notify you by phone as soon as possible.  Submit refill requests through [email or web address] or call your pharmacy and they will forward the refill request to us. Please allow 3 business days for your refill to be completed.          Additional Information About Your Visit        MyChart Information     [email or web address] gives you secure access to your electronic health record. If you see a primary care provider, you can also send messages to your care team and make appointments. If you have questions, please call your primary care clinic.  If you do not have a primary care provider, please call [phone] and they will assist you.        Care EveryWhere ID     This is your Care EveryWhere ID. This could be used by other organizations to access your [facility] medical records  [id number]        Your Vitals Were     Height BMI (Body Mass Index)                4' 11\" (1.499 m) 27.87 kg/m2           Blood Pressure from Last 3 Encounters:   09/28/18 119/80   09/17/18 120/84   07/20/18 118/79    Weight from Last 3 Encounters: "   09/28/18 138 lb (62.6 kg)   09/17/18 138 lb (62.6 kg)   07/20/18 136 lb 12.8 oz (62.1 kg)              We Performed the Following     ASPIRATION &/OR INJECTION GANGLION CYST, ANY     TRIAMCINOLONE ACET INJ NOS     US GUIDE FOR NEEDLE PLACEMENT          Today's Medication Changes          These changes are accurate as of 9/28/18  3:43 PM.  If you have any questions, ask your nurse or doctor.               Start taking these medicines.        Dose/Directions    triamcinolone acetonide 40 MG/ML injection   Commonly known as:  KENALOG-40   Used for:  Ganglion cyst of wrist, right   Started by:  Moises Tamez,         Dose:  20 mg   0.5 mLs (20 mg) by INTRA-ARTICULAR route once for 1 dose   Quantity:  0.5 mL   Refills:  0            Where to get your medicines      Some of these will need a paper prescription and others can be bought over the counter.  Ask your nurse if you have questions.     You don't need a prescription for these medications     triamcinolone acetonide 40 MG/ML injection                Primary Care Provider Fax #    [facility] [phone] [address]  [facility] [address]        Equal Access to Services     CHUY TAPIA AH: Hadii lashae spearso Sovalarie, waaxda luqadaha, qaybta kaalmada ademacyada, ginger goel. So [facility] [phone].    ATENCIÓN: Si habla español, tiene a poole disposición servicios gratuitos de asistencia lingüística. Llame al [phone].    We comply with applicable federal civil rights laws and [place] laws. We do not discriminate on the basis of race, color, national origin, age, disability, sex, sexual orientation, or gender identity.            Thank you!     Thank you for choosing [facility]  for your care. Our goal is always to provide you with excellent care. Hearing back from our patients is one way we can continue to improve our services. Please take a  few minutes to complete the written survey that you may receive in the mail after your visit with us. Thank you!             Your Updated Medication List - Protect others around you: Learn how to safely use, store and throw away your medicines at www.disposemymeds.org.          This list is accurate as of 9/28/18  3:43 PM.  Always use your most recent med list.                   Brand Name Dispense Instructions for use Diagnosis    order for DME     1 Device    Equipment being ordered: Thumb spica brace left    Radial styloid tenosynovitis       triamcinolone acetonide 40 MG/ML injection    KENALOG-40    0.5 mL    0.5 mLs (20 mg) by INTRA-ARTICULAR route once for 1 dose    Ganglion cyst of wrist, right

## 2018-12-07 NOTE — PROGRESS NOTES
Noted. Telephone Encounter by Kathy Cobb MD at 07/12/17 08:52 AM     Author:  Kathy Cobb MD Service:  (none) Author Type:  Physician     Filed:  07/12/17 08:55 AM Encounter Date:  7/10/2017 Status:  Signed     :  Kathy Cobb MD (Physician)            ok to start victoza ,start at 0.6 mg daily injection ,can increase to 1.2 mg after 2 weeks.3 months supply  frequent acuchecks as bs can drop and be low.[AG1.1M]        Revision History        User Key Date/Time User Provider Type Action    > AG1.1 07/12/17 08:55 AM Kathy Cobb MD Physician Sign    M - Manual

## 2019-02-27 ENCOUNTER — OFFICE VISIT (OUTPATIENT)
Dept: ORTHOPEDICS | Facility: CLINIC | Age: 34
End: 2019-02-27
Payer: COMMERCIAL

## 2019-02-27 VITALS
BODY MASS INDEX: 27.21 KG/M2 | SYSTOLIC BLOOD PRESSURE: 113 MMHG | HEIGHT: 59 IN | DIASTOLIC BLOOD PRESSURE: 74 MMHG | WEIGHT: 135 LBS

## 2019-02-27 DIAGNOSIS — M67.431 GANGLION CYST OF WRIST, RIGHT: Primary | ICD-10-CM

## 2019-02-27 PROCEDURE — 20612 ASPIRATE/INJ GANGLION CYST: CPT | Mod: RT | Performed by: FAMILY MEDICINE

## 2019-02-27 PROCEDURE — 99213 OFFICE O/P EST LOW 20 MIN: CPT | Mod: 25 | Performed by: FAMILY MEDICINE

## 2019-02-27 RX ADMIN — TRIAMCINOLONE ACETONIDE 20 MG: 40 INJECTION, SUSPENSION INTRA-ARTICULAR; INTRAMUSCULAR at 19:50

## 2019-02-27 RX ADMIN — ROPIVACAINE HYDROCHLORIDE 0.5 ML: 5 INJECTION, SOLUTION EPIDURAL; INFILTRATION; PERINEURAL at 19:50

## 2019-02-27 ASSESSMENT — MIFFLIN-ST. JEOR: SCORE: 1227.99

## 2019-02-27 NOTE — LETTER
2019         RE: Rylan Raygoza  [address]  [place] [address]        Dear Colleague,    Thank you for referring your patient, Ryaln Raygoza, to the [facility] [place]. Please see a copy of my visit note below.    Rylan Raygoza  :  1985  DOS: 2019  MRN: [id number]      Sports Medicine Clinic Visit    PCP: Physicians, [facility]    Rylan Raygoza is a 32 year old female who is seen  as a self referral presenting with bilateral wrist pain and swelling    Injury: She reports bilateral masses in wrist, pain in left wrist. She has an observable mass on the right dorsal wrist, has previously seen Dr. Parikh for this.  She has also noted a smaller palpable mass along the radial aspect of the left wrist. Both masses are painful to the touch.  No new injury    Location of Pain: bilateral wrist  Duration of Pain: 1 year(s)  Rating of Pain at worst: 8/10  Rating of Pain Currently: 3/10  Symptoms are better with: Tylenol and Rest  Symptoms are worse with: extension, flexion and hand and wrist over use  Additional Features:   Positive: swelling, paresthesias, numbness and weakness   Negative: bruising, popping, grinding, catching, locking and instability  Other evaluation and/or treatments so far consists of: Tylenol and Rest  Prior History of related problems: nothing    Social History: unemployed. Previous     Interim History - 2019  Since last visit on 2018 patient has mild moderate right wrist pain with return of dorsal soft tissue mass over the last ~ 2 weeks.  Right wrist ganglion cyst aspiration and steroid injection completed on  provided good relief for ~ 5 months.  Ganglion cyst noted in same location today.  No new injury in the interim.     Review of Systems  Skin: no bruising, yes swelling  Musculoskeletal: as above  Neurologic: no numbness, paresthesias  Remainder of review of  "systems is negative including constitutional, CV, pulmonary, GI, except as noted in HPI or medical history.    Patient's current problem list, past medical and surgical history, and family history were reviewed.    Patient Active Problem List   Diagnosis     CARDIOVASCULAR SCREENING; LDL GOAL LESS THAN 160     Encounter for supervision of other normal pregnancy     Not immune to rubella     Anemia of mother, complicating pregnancy, childbirth, or the puerperium, unspecified as to episode of care(468.20)     Carrier or suspected carrier of group B Streptococcus     Past Medical History:   Diagnosis Date     NO ACTIVE PROBLEMS      Past Surgical History:   Procedure Laterality Date     NO HISTORY OF SURGERY       No family history on file.      Objective  /74   Ht 1.499 m (4' 11\")   Wt 61.2 kg (135 lb)   BMI 27.27 kg/m       GENERAL APPEARANCE: healthy, alert and no distress   GAIT: NORMAL  SKIN: no suspicious lesions or rashes  HEENT: Sclera clear, anicteric  CV: good peripheral pulses  RESP: Breathing not labored  NEURO: Normal strength and tone, mentation intact and speech normal  PSYCH:  mentation appears normal and affect normal/bright    Bilateral Wrist and Hand exam    Inspection:       Swelling: right dorsal ganglion cyst       No mass left wrist    Tender:       Over right dorsal ganglion cyst, left radial styloid tendons    Non Tender:       Remainder of the Wrist and Hand bilateral    ROM:       Full and symmetric active and passive range of motion of the forearm, wrist and digits bilateral    Strength:       5/5 strength in the muscles of the hand, wrist and forearm bilateral    Special Tests:        positive (+) Finkelstein's maneuver left    Neurovascular:       2+ radial pulses bilaterally with brisk capillary refill and      normal sensation to light touch in the radial, median and ulnar nerve distributions      Radiology  I ordered, visualized and reviewed these images with the patient  Xr " Wrist Left G/e 3 Views  Result Date: 9/18/2018  LEFT WRIST THREE OR MORE VIEWS 9/17/2018 6:54 PM HISTORY: Left wrist pain. COMPARISON: None.   IMPRESSION: No bony or soft tissue abnormality. DEBBIE HOWELL MD    I visualized and reviewed these images with the patient  Results for orders placed or performed in visit on 11/09/17   XR Wrist Right G/E 3 Views    Narrative    XR WRIST RT G/E 3 VW 11/9/2017 4:19 PM     HISTORY: ; Mass of wrist, right      Impression    IMPRESSION: The wrist appears within normal limits. No soft tissue  calcification or radiopaque foreign body is seen.    JESUS ALBERTO RUIZ MD     Hand / Upper Extremity Injection/Arthrocentesis: R wrist  Date/Time: 2/27/2019 7:50 PM  Performed by: Moises Tamez DO  Authorized by: Moises Tamez DO     Indications:  Therapeutic  Needle Size:  25 G  Guidance: ultrasound    Approach:  Dorsal  Condition: carpal ganglion, dorsal    Site:  R wrist  Medications:  20 mg triamcinolone 40 MG/ML; 0.5 mL ropivacaine 5 MG/ML  Aspirate amount (mL):  1  Aspirate:  Gel like and clear  Outcome:  Tolerated well, no immediate complications  Procedure discussed: discussed risks, benefits, and alternatives    Consent Given by:  Patient  Prep: patient was prepped and draped in usual sterile fashion            Assessment:  1. Ganglion cyst of wrist, right        Plan:  - Today's Plan of Care:  US guided Ganglion aspiration and injection performed today  Call if cyst recurs again and will consider referral to hand surgeon  Wrist brace and rest reviewed  Expectations and goals of CSI reviewed  Often 2-3 days for steroid effect, and can take up to two weeks for maximum effect  We discussed modified progressive pain-free activity as tolerated  Do not overuse in first two weeks if feeling better due to concern for vulnerability while steroid is working  Supportive care reviewed  All questions were answered today  Contact us with additional questions or  concerns  Signs and sx of concern reviewed      Moises Tamez DO, CAQ  Primary Care Sports Medicine  [facility]         Again, thank you for allowing me to participate in the care of your patient.        Sincerely,        Moises Tamez DO

## 2019-02-28 NOTE — PROGRESS NOTES
Rylan Raygoza  :  1985  DOS: 2019  MRN: [id number]      Sports Medicine Clinic Visit    PCP: Physicians, [facility]i [facility]    Rylan Raygoza is a 32 year old female who is seen  as a self referral presenting with bilateral wrist pain and swelling    Injury: She reports bilateral masses in wrist, pain in left wrist. She has an observable mass on the right dorsal wrist, has previously seen Dr. Parikh for this.  She has also noted a smaller palpable mass along the radial aspect of the left wrist. Both masses are painful to the touch.  No new injury    Location of Pain: bilateral wrist  Duration of Pain: 1 year(s)  Rating of Pain at worst: 8/10  Rating of Pain Currently: 3/10  Symptoms are better with: Tylenol and Rest  Symptoms are worse with: extension, flexion and hand and wrist over use  Additional Features:   Positive: swelling, paresthesias, numbness and weakness   Negative: bruising, popping, grinding, catching, locking and instability  Other evaluation and/or treatments so far consists of: Tylenol and Rest  Prior History of related problems: nothing    Social History: unemployed. Previous     Interim History - 2019  Since last visit on 2018 patient has mild moderate right wrist pain with return of dorsal soft tissue mass over the last ~ 2 weeks.  Right wrist ganglion cyst aspiration and steroid injection completed on  provided good relief for ~ 5 months.  Ganglion cyst noted in same location today.  No new injury in the interim.     Review of Systems  Skin: no bruising, yes swelling  Musculoskeletal: as above  Neurologic: no numbness, paresthesias  Remainder of review of systems is negative including constitutional, CV, pulmonary, GI, except as noted in HPI or medical history.    Patient's current problem list, past medical and surgical history, and family history were reviewed.    Patient Active Problem List   Diagnosis     CARDIOVASCULAR  "SCREENING; LDL GOAL LESS THAN 160     Encounter for supervision of other normal pregnancy     Not immune to rubella     Anemia of mother, complicating pregnancy, childbirth, or the puerperium, unspecified as to episode of care(648.20)     Carrier or suspected carrier of group B Streptococcus     Past Medical History:   Diagnosis Date     NO ACTIVE PROBLEMS      Past Surgical History:   Procedure Laterality Date     NO HISTORY OF SURGERY       No family history on file.      Objective  /74   Ht 1.499 m (4' 11\")   Wt 61.2 kg (135 lb)   BMI 27.27 kg/m      GENERAL APPEARANCE: healthy, alert and no distress   GAIT: NORMAL  SKIN: no suspicious lesions or rashes  HEENT: Sclera clear, anicteric  CV: good peripheral pulses  RESP: Breathing not labored  NEURO: Normal strength and tone, mentation intact and speech normal  PSYCH:  mentation appears normal and affect normal/bright    Bilateral Wrist and Hand exam    Inspection:       Swelling: right dorsal ganglion cyst       No mass left wrist    Tender:       Over right dorsal ganglion cyst, left radial styloid tendons    Non Tender:       Remainder of the Wrist and Hand bilateral    ROM:       Full and symmetric active and passive range of motion of the forearm, wrist and digits bilateral    Strength:       5/5 strength in the muscles of the hand, wrist and forearm bilateral    Special Tests:        positive (+) Finkelstein's maneuver left    Neurovascular:       2+ radial pulses bilaterally with brisk capillary refill and      normal sensation to light touch in the radial, median and ulnar nerve distributions      Radiology  I ordered, visualized and reviewed these images with the patient  Xr Wrist Left G/e 3 Views  Result Date: 9/18/2018  LEFT WRIST THREE OR MORE VIEWS 9/17/2018 6:54 PM HISTORY: Left wrist pain. COMPARISON: None.   IMPRESSION: No bony or soft tissue abnormality. DEBBIE HOWELL MD    I visualized and reviewed these images with the patient  Results " for orders placed or performed in visit on 11/09/17   XR Wrist Right G/E 3 Views    Narrative    XR WRIST RT G/E 3 VW 11/9/2017 4:19 PM     HISTORY: ; Mass of wrist, right      Impression    IMPRESSION: The wrist appears within normal limits. No soft tissue  calcification or radiopaque foreign body is seen.    JESUS ALBERTO RUIZ MD     Hand / Upper Extremity Injection/Arthrocentesis: R wrist  Date/Time: 2/27/2019 7:50 PM  Performed by: Moises Tamez DO  Authorized by: Moises Tamez DO     Indications:  Therapeutic  Needle Size:  25 G  Guidance: ultrasound    Approach:  Dorsal  Condition: carpal ganglion, dorsal    Site:  R wrist  Medications:  20 mg triamcinolone 40 MG/ML; 0.5 mL ropivacaine 5 MG/ML  Aspirate amount (mL):  1  Aspirate:  Gel like and clear  Outcome:  Tolerated well, no immediate complications  Procedure discussed: discussed risks, benefits, and alternatives    Consent Given by:  Patient  Prep: patient was prepped and draped in usual sterile fashion            Assessment:  1. Ganglion cyst of wrist, right        Plan:  - Today's Plan of Care:  US guided Ganglion aspiration and injection performed today  Call if cyst recurs again and will consider referral to hand surgeon  Wrist brace and rest reviewed  Expectations and goals of CSI reviewed  Often 2-3 days for steroid effect, and can take up to two weeks for maximum effect  We discussed modified progressive pain-free activity as tolerated  Do not overuse in first two weeks if feeling better due to concern for vulnerability while steroid is working  Supportive care reviewed  All questions were answered today  Contact us with additional questions or concerns  Signs and sx of concern reviewed      Moises Tamez DO, CAQ  Primary Care Sports Medicine  [facility]

## 2019-03-02 RX ORDER — ROPIVACAINE HYDROCHLORIDE 5 MG/ML
0.5 INJECTION, SOLUTION EPIDURAL; INFILTRATION; PERINEURAL
Status: DISCONTINUED | OUTPATIENT
Start: 2019-02-27 | End: 2019-10-15

## 2019-03-02 RX ORDER — TRIAMCINOLONE ACETONIDE 40 MG/ML
20 INJECTION, SUSPENSION INTRA-ARTICULAR; INTRAMUSCULAR
Status: DISCONTINUED | OUTPATIENT
Start: 2019-02-27 | End: 2019-10-15

## 2019-10-15 ENCOUNTER — OFFICE VISIT (OUTPATIENT)
Dept: FAMILY MEDICINE | Facility: CLINIC | Age: 34
End: 2019-10-15
Payer: COMMERCIAL

## 2019-10-15 VITALS
OXYGEN SATURATION: 98 % | SYSTOLIC BLOOD PRESSURE: 109 MMHG | HEART RATE: 75 BPM | DIASTOLIC BLOOD PRESSURE: 75 MMHG | TEMPERATURE: 97.9 F | HEIGHT: 59 IN | WEIGHT: 151 LBS | BODY MASS INDEX: 30.44 KG/M2

## 2019-10-15 DIAGNOSIS — Z11.1 SCREENING EXAMINATION FOR PULMONARY TUBERCULOSIS: Primary | ICD-10-CM

## 2019-10-15 DIAGNOSIS — Z23 NEED FOR PROPHYLACTIC VACCINATION AND INOCULATION AGAINST INFLUENZA: ICD-10-CM

## 2019-10-15 PROCEDURE — 99212 OFFICE O/P EST SF 10 MIN: CPT | Mod: 25 | Performed by: PREVENTIVE MEDICINE

## 2019-10-15 PROCEDURE — 86481 TB AG RESPONSE T-CELL SUSP: CPT | Performed by: PREVENTIVE MEDICINE

## 2019-10-15 PROCEDURE — 36415 COLL VENOUS BLD VENIPUNCTURE: CPT | Performed by: PREVENTIVE MEDICINE

## 2019-10-15 PROCEDURE — 90686 IIV4 VACC NO PRSV 0.5 ML IM: CPT | Performed by: PREVENTIVE MEDICINE

## 2019-10-15 PROCEDURE — 90471 IMMUNIZATION ADMIN: CPT | Performed by: PREVENTIVE MEDICINE

## 2019-10-15 ASSESSMENT — PAIN SCALES - GENERAL: PAINLEVEL: NO PAIN (0)

## 2019-10-15 ASSESSMENT — MIFFLIN-ST. JEOR: SCORE: 1292.62

## 2019-10-15 NOTE — PROGRESS NOTES
"Bonifacio Raygoza is a 33 year old female who presents to clinic today for the following health issues:    HPI   Needs TB Gold  Negative TST in the past  For work  No exposures    No unexplained hoarseness  No loss of appetite  No unexplained weight loss  No productive or prolonged cough  No bloody sputum  Np persistent fever over 100 F  No night sweats  No hemoptysis  No shortness of breath  No unexplained fatigue or weakness  No chest pain  No recurrent pneumonia  No unprotected exposure to a known TB patient    Patient Active Problem List   Diagnosis     CARDIOVASCULAR SCREENING; LDL GOAL LESS THAN 160     Encounter for supervision of other normal pregnancy     Not immune to rubella     Anemia of mother, complicating pregnancy, childbirth, or the puerperium, unspecified as to episode of care(168.13)     Carrier or suspected carrier of group B Streptococcus     Past Surgical History:   Procedure Laterality Date     NO HISTORY OF SURGERY         Social History     Tobacco Use     Smoking status: Never Smoker     Smokeless tobacco: Never Used   Substance Use Topics     Alcohol use: No     History reviewed. No pertinent family history.      No current outpatient medications on file.     Allergies   Allergen Reactions     Nkda [No Known Drug Allergies]      BP Readings from Last 3 Encounters:   10/15/19 109/75   02/27/19 113/74   09/28/18 119/80    Wt Readings from Last 3 Encounters:   10/15/19 68.5 kg (151 lb)   02/27/19 61.2 kg (135 lb)   09/28/18 62.6 kg (138 lb)                 Reviewed and updated as needed this visit by Provider  Tobacco  Allergies  Meds  Problems  Med Hx  Surg Hx  Fam Hx         Review of Systems   ROS COMP: Constitutional, HEENT, cardiovascular, pulmonary, gi and gu systems are negative, except as otherwise noted.      Objective    /75   Pulse 75   Temp 97.9  F (36.6  C) (Oral)   Ht 1.486 m (4' 10.5\")   Wt 68.5 kg (151 lb)   LMP 10/11/2019 (Exact Date)   " "SpO2 98%   Breastfeeding? No   BMI 31.02 kg/m    Body mass index is 31.02 kg/m .  Physical Exam   GENERAL APPEARANCE: healthy, alert and no distress  EYES: Eyes grossly normal to inspection and conjunctivae and sclerae normal  NECK: no adenopathy and trachea midline and normal to palpation  RESP: lungs clear to auscultation - no rales, rhonchi or wheezes  CV: regular rates and rhythm, normal S1 S2, no S3 or S4 and no murmur, click or rub  ABDOMEN: soft, non-tender and no rebound or guarding   MS: extremities normal- no gross deformities noted and peripheral pulses normal  SKIN: no suspicious lesions or rashes  NEURO: Normal strength and tone, mentation intact and speech normal  PSYCH: mentation appears normal      Diagnostic Test Results:  Labs reviewed in Epic  No results found for this or any previous visit (from the past 24 hour(s)).        Assessment & Plan     Rylan was seen today for needs tb gold and imm/inj.    Diagnoses and all orders for this visit:    Screening examination for pulmonary tuberculosis  -     Quantiferon TB Gold Plus  -await lab results   -no history of BCG vaccination     Need for prophylactic vaccination and inoculation against influenza  -     INFLUENZA VACCINE IM > 6 MONTHS VALENT IIV4 [09562]  -     Vaccine Administration, Initial [32087]         BMI:   Estimated body mass index is 31.02 kg/m  as calculated from the following:    Height as of this encounter: 1.486 m (4' 10.5\").    Weight as of this encounter: 68.5 kg (151 lb).     Return in about 1 year (around 10/15/2020) for Routine Visit.    Caryl Parekh MD MPH    [facility]      "

## 2019-10-16 LAB
GAMMA INTERFERON BACKGROUND BLD IA-ACNC: 0.73 IU/ML
M TB IFN-G BLD-IMP: NEGATIVE
M TB IFN-G CD4+ BCKGRND COR BLD-ACNC: >10 IU/ML
MITOGEN IGNF BCKGRD COR BLD-ACNC: 0 IU/ML
MITOGEN IGNF BCKGRD COR BLD-ACNC: 0 IU/ML

## 2019-10-16 NOTE — RESULT ENCOUNTER NOTE
Filibertorissa, your test results were within normal limits.  Blood test for Tuberculosis did not show any abnormalities.     Please do not hesitate to call us at [phone] if you have any questions or concerns.    Thank you,    Caryl Parekh MD MPH

## 2020-02-23 ENCOUNTER — HEALTH MAINTENANCE LETTER (OUTPATIENT)
Age: 35
End: 2020-02-23

## 2020-04-08 ENCOUNTER — TELEPHONE (OUTPATIENT)
Dept: OPHTHALMOLOGY | Facility: CLINIC | Age: 35
End: 2020-04-08

## 2020-04-08 NOTE — TELEPHONE ENCOUNTER
"Spoke to patient- she woke today with right eye very itchy and a foreign body sensation.  After she rubbed her right eye, it became painful, very watery, and she was unable to open it.  When she looks in the mirror, she can see a \"blister-like object\" around her iris, and her right eye is red.  Currently, she is unable to keep her eye open and it is very watery.  Difficult to assess vision because her eye is so watery and uncomfortable.  She denies any known foreign body, trauma, or new cosmetics/facial cream.  No contact lenses.    I informed her I would review her message with the Doctor on-call, and call her back with a treatment plan.    "

## 2020-04-08 NOTE — TELEPHONE ENCOUNTER
Dr. Sandoval reviewed her mesaage and recommends the patient come in today for an appointment @ 11:20.    Attempted to call patient, but had to leave a message requesting her to call our office

## 2020-04-08 NOTE — TELEPHONE ENCOUNTER
Rosalee would like a return phone call to discuss red and swollen eyes she is experiencing. Please joel [phone].

## 2020-12-12 ENCOUNTER — HEALTH MAINTENANCE LETTER (OUTPATIENT)
Age: 35
End: 2020-12-12

## 2021-04-11 ENCOUNTER — HEALTH MAINTENANCE LETTER (OUTPATIENT)
Age: 36
End: 2021-04-11

## 2021-09-26 ENCOUNTER — HEALTH MAINTENANCE LETTER (OUTPATIENT)
Age: 36
End: 2021-09-26

## 2022-05-08 ENCOUNTER — HEALTH MAINTENANCE LETTER (OUTPATIENT)
Age: 37
End: 2022-05-08

## 2023-01-08 ENCOUNTER — HEALTH MAINTENANCE LETTER (OUTPATIENT)
Age: 38
End: 2023-01-08

## 2023-06-02 ENCOUNTER — HEALTH MAINTENANCE LETTER (OUTPATIENT)
Age: 38
End: 2023-06-02

## 2023-07-19 ASSESSMENT — ENCOUNTER SYMPTOMS
HEADACHES: 0
CONSTIPATION: 0
EYE PAIN: 0
JOINT SWELLING: 0
NAUSEA: 0
DIARRHEA: 0
HEMATURIA: 0
BREAST MASS: 0
ARTHRALGIAS: 0
HEARTBURN: 0
WEAKNESS: 0
COUGH: 0
FEVER: 0
PARESTHESIAS: 0
MYALGIAS: 0
DIZZINESS: 0
SORE THROAT: 0
ABDOMINAL PAIN: 0
DYSURIA: 0
FREQUENCY: 0
HEMATOCHEZIA: 0
CHILLS: 0
NERVOUS/ANXIOUS: 0
SHORTNESS OF BREATH: 0
PALPITATIONS: 0

## 2023-07-21 ENCOUNTER — OFFICE VISIT (OUTPATIENT)
Dept: FAMILY MEDICINE | Facility: CLINIC | Age: 38
End: 2023-07-21
Payer: COMMERCIAL

## 2023-07-21 VITALS
TEMPERATURE: 98 F | OXYGEN SATURATION: 98 % | BODY MASS INDEX: 30.84 KG/M2 | SYSTOLIC BLOOD PRESSURE: 100 MMHG | HEIGHT: 59 IN | RESPIRATION RATE: 16 BRPM | HEART RATE: 77 BPM | DIASTOLIC BLOOD PRESSURE: 60 MMHG | WEIGHT: 153 LBS

## 2023-07-21 DIAGNOSIS — Z11.59 NEED FOR HEPATITIS C SCREENING TEST: ICD-10-CM

## 2023-07-21 DIAGNOSIS — Z12.4 CERVICAL CANCER SCREENING: ICD-10-CM

## 2023-07-21 DIAGNOSIS — Z23 HIGH PRIORITY FOR 2019-NCOV VACCINE: ICD-10-CM

## 2023-07-21 DIAGNOSIS — Z13.1 SCREENING FOR DIABETES MELLITUS: ICD-10-CM

## 2023-07-21 DIAGNOSIS — Z13.220 SCREENING FOR HYPERLIPIDEMIA: ICD-10-CM

## 2023-07-21 DIAGNOSIS — R73.09 ELEVATED HEMOGLOBIN A1C: ICD-10-CM

## 2023-07-21 DIAGNOSIS — Z00.00 ROUTINE HISTORY AND PHYSICAL EXAMINATION OF ADULT: Primary | ICD-10-CM

## 2023-07-21 LAB — HBA1C MFR BLD: 6.5 % (ref 0–5.6)

## 2023-07-21 PROCEDURE — G0145 SCR C/V CYTO,THINLAYER,RESCR: HCPCS | Performed by: NURSE PRACTITIONER

## 2023-07-21 PROCEDURE — 80061 LIPID PANEL: CPT | Performed by: NURSE PRACTITIONER

## 2023-07-21 PROCEDURE — 0121A COVID-19 BIVALENT 12+ (PFIZER): CPT | Performed by: NURSE PRACTITIONER

## 2023-07-21 PROCEDURE — 99385 PREV VISIT NEW AGE 18-39: CPT | Mod: 25 | Performed by: NURSE PRACTITIONER

## 2023-07-21 PROCEDURE — 83036 HEMOGLOBIN GLYCOSYLATED A1C: CPT | Performed by: NURSE PRACTITIONER

## 2023-07-21 PROCEDURE — 87491 CHLMYD TRACH DNA AMP PROBE: CPT | Performed by: NURSE PRACTITIONER

## 2023-07-21 PROCEDURE — 36415 COLL VENOUS BLD VENIPUNCTURE: CPT | Performed by: NURSE PRACTITIONER

## 2023-07-21 PROCEDURE — 86803 HEPATITIS C AB TEST: CPT | Performed by: NURSE PRACTITIONER

## 2023-07-21 PROCEDURE — 87624 HPV HI-RISK TYP POOLED RSLT: CPT | Performed by: NURSE PRACTITIONER

## 2023-07-21 PROCEDURE — 91312 COVID-19 BIVALENT 12+ (PFIZER): CPT | Performed by: NURSE PRACTITIONER

## 2023-07-21 PROCEDURE — 86706 HEP B SURFACE ANTIBODY: CPT | Performed by: NURSE PRACTITIONER

## 2023-07-21 PROCEDURE — 87591 N.GONORRHOEAE DNA AMP PROB: CPT | Performed by: NURSE PRACTITIONER

## 2023-07-21 ASSESSMENT — ENCOUNTER SYMPTOMS
BREAST MASS: 0
WEAKNESS: 0
DIARRHEA: 0
PALPITATIONS: 0
CHILLS: 0
DYSURIA: 0
FREQUENCY: 0
MYALGIAS: 0
COUGH: 0
CONSTIPATION: 0
ABDOMINAL PAIN: 0
SORE THROAT: 0
JOINT SWELLING: 0
ARTHRALGIAS: 0
HEARTBURN: 0
HEMATOCHEZIA: 0
SHORTNESS OF BREATH: 0
NERVOUS/ANXIOUS: 0
HEADACHES: 0
DIZZINESS: 0
NAUSEA: 0
PARESTHESIAS: 0
HEMATURIA: 0
EYE PAIN: 0
FEVER: 0

## 2023-07-21 ASSESSMENT — PAIN SCALES - GENERAL: PAINLEVEL: NO PAIN (0)

## 2023-07-21 NOTE — NURSING NOTE
Prior to immunization administration, verified patients identity using patient s name and date of birth. Please see Immunization Activity for additional information.     Screening Questionnaire for Adult Immunization    Are you sick today?   No   Do you have allergies to medications, food, a vaccine component or latex?   No   Have you ever had a serious reaction after receiving a vaccination?   No   Do you have a long-term health problem with heart, lung, kidney, or metabolic disease (e.g., diabetes), asthma, a blood disorder, no spleen, complement component deficiency, a cochlear implant, or a spinal fluid leak?  Are you on long-term aspirin therapy?   No   Do you have cancer, leukemia, HIV/AIDS, or any other immune system problem?   No   Do you have a parent, brother, or sister with an immune system problem?   No   In the past 3 months, have you taken medications that affect  your immune system, such as prednisone, other steroids, or anticancer drugs; drugs for the treatment of rheumatoid arthritis, Crohn s disease, or psoriasis; or have you had radiation treatments?   No   Have you had a seizure, or a brain or other nervous system problem?   No   During the past year, have you received a transfusion of blood or blood    products, or been given immune (gamma) globulin or antiviral drug?   No   For women: Are you pregnant or is there a chance you could become       pregnant during the next month?   No   Have you received any vaccinations in the past 4 weeks?   No     Immunization questionnaire answers were all negative.  Pfizer Bivalent        Patient instructed to remain in clinic for 15 minutes afterwards, and to report any adverse reactions.     Screening performed by Mikayla Collado MA on 7/21/2023 at 2:33 PM.      .

## 2023-07-21 NOTE — LETTER
"July 24, 2023      Rylan Raygoza  [address]  [place] [address]        Dear ,    We are writing to inform you of your test results.    Your cholesterol is abnormal, please use the recommendations below and recheck labs in 6 months.     Ways to improve your cholesterol...     1- Eats less saturated fats (including avoiding \"trans\" fats).     2 - Eat more unsaturated fats  - found in vegetables, grains, and tree nuts.   Also by replacing butter with canola oil or olive oil.     3 - Eat more nuts. 1-2 ounces (a small handful) of almonds, walnuts, hazelnuts or pecans once a day in place of other less healthy snacks.     4 - Eat more high fiber foods - vegetables and whole grains including oat bran, oats, beans, peas, and flax seed.     5 - Eat more fish - such as salmon, tuna, mackerel, and sardines.  1 or 2 six ounce servings per week is a healthy replacement for other proteins.     6 - Exercise for at least 120 minutes per week - which is equal to 30 minutes 4 days per week.     Resulted Orders   Chlamydia trachomatis/Neisseria gonorrhoeae by PCR - Clinic Collect   Result Value Ref Range    Chlamydia Trachomatis Negative Negative      Comment:      Negative for C. trachomatis rRNA by transcription mediated amplification.   A negative result by transcription mediated amplification does not preclude the presence of infection because results are dependent on proper and adequate collection, absence of inhibitors and sufficient rRNA to be detected.    Neisseria gonorrhoeae Negative Negative      Comment:      Negative for N. gonorrhoeae rRNA by transcription mediated amplification. A negative result by transcription mediated amplification does not preclude the presence of C. trachomatis infection because results are dependent on proper and adequate collection, absence of inhibitors and sufficient rRNA to be detected.   Hepatitis C Screen Reflex to HCV RNA Quant and Genotype   Result Value Ref " Range    Hepatitis C Antibody Nonreactive Nonreactive    Narrative    Assay performance characteristics have not been established for newborns, infants, and children.   Hepatitis B Surface Antibody   Result Value Ref Range    Hepatitis B Surface Antibody Instrument Value 906.23 <8.00 m[IU]/mL    Hepatitis B Surface Antibody Reactive       Comment:      Patient is considered to be immune to infection with hepatitis B when the value is greater than or equal to 12.00 mIU/mL.   Lipid panel reflex to direct LDL Non-fasting   Result Value Ref Range    Cholesterol 173 <200 mg/dL    Triglycerides 343 (H) <150 mg/dL    Direct Measure HDL 36 (L) >=50 mg/dL    LDL Cholesterol Calculated 68 <=100 mg/dL    Non HDL Cholesterol 137 (H) <130 mg/dL    Narrative    Cholesterol  Desirable:  <200 mg/dL    Triglycerides  Normal:  Less than 150 mg/dL  Borderline High:  150-199 mg/dL  High:  200-499 mg/dL  Very High:  Greater than or equal to 500 mg/dL    Direct Measure HDL  Female:  Greater than or equal to 50 mg/dL   Male:  Greater than or equal to 40 mg/dL    LDL Cholesterol  Desirable:  <100mg/dL  Above Desirable:  100-129 mg/dL   Borderline High:  130-159 mg/dL   High:  160-189 mg/dL   Very High:  >= 190 mg/dL    Non HDL Cholesterol  Desirable:  130 mg/dL  Above Desirable:  130-159 mg/dL  Borderline High:  160-189 mg/dL  High:  190-219 mg/dL  Very High:  Greater than or equal to 220 mg/dL   Hemoglobin A1c   Result Value Ref Range    Hemoglobin A1C 6.5 (H) 0.0 - 5.6 %      Comment:      Normal <5.7%   Prediabetes 5.7-6.4%    Diabetes 6.5% or higher     Note: Adopted from ADA consensus guidelines.       If you have any questions or concerns, please call the clinic at the number listed above.       Sincerely,      Jessica Armstrong NP

## 2023-07-22 LAB
C TRACH DNA SPEC QL PROBE+SIG AMP: NEGATIVE
CHOLEST SERPL-MCNC: 173 MG/DL
HBV SURFACE AB SERPL IA-ACNC: 906.23 M[IU]/ML
HBV SURFACE AB SERPL IA-ACNC: REACTIVE M[IU]/ML
HCV AB SERPL QL IA: NONREACTIVE
HDLC SERPL-MCNC: 36 MG/DL
LDLC SERPL CALC-MCNC: 68 MG/DL
N GONORRHOEA DNA SPEC QL NAA+PROBE: NEGATIVE
NONHDLC SERPL-MCNC: 137 MG/DL
TRIGL SERPL-MCNC: 343 MG/DL

## 2023-07-24 NOTE — RESULT ENCOUNTER NOTE
"Your cholesterol is abnormal, please use the recommendations below and recheck labs in 6 months.    Ways to improve your cholesterol...    1- Eats less saturated fats (including avoiding \"trans\" fats).    2 - Eat more unsaturated fats  - found in vege  tables, grains, and tree nuts.   Also by replacing butter with canola oil or olive oil.    3 - Eat more nuts.   1-2 ounces (a small handful) of almonds, walnuts, hazelnuts or pecans once a  day in place of other less healthy snacks.    4 - Eat more high   fiber foods - vegetables and whole grains including oat bran, oats, beans, peas, and flax seed.    5 - Eat more fish - such as salmon, tuna, mackerel, and sardines.  1 or 2 six ounce servings per week is a healthy replacement for other proteins.    6 - E  xercise for at least 120 minutes per week - which is equal to 30 minutes 4 days per week.    Clare Hankins D.O.    "

## 2023-07-26 LAB
BKR LAB AP GYN ADEQUACY: NORMAL
BKR LAB AP GYN INTERPRETATION: NORMAL
BKR LAB AP HPV REFLEX: NORMAL
BKR LAB AP PREVIOUS ABNORMAL: NORMAL
PATH REPORT.COMMENTS IMP SPEC: NORMAL
PATH REPORT.COMMENTS IMP SPEC: NORMAL
PATH REPORT.RELEVANT HX SPEC: NORMAL

## 2023-07-28 LAB
HUMAN PAPILLOMA VIRUS 16 DNA: NEGATIVE
HUMAN PAPILLOMA VIRUS 18 DNA: NEGATIVE
HUMAN PAPILLOMA VIRUS FINAL DIAGNOSIS: NORMAL
HUMAN PAPILLOMA VIRUS OTHER HR: NEGATIVE

## 2023-08-10 PROBLEM — E78.2 MIXED HYPERLIPIDEMIA: Status: ACTIVE | Noted: 2023-08-10

## 2023-12-03 ENCOUNTER — HEALTH MAINTENANCE LETTER (OUTPATIENT)
Age: 38
End: 2023-12-03

## 2024-04-21 ENCOUNTER — HEALTH MAINTENANCE LETTER (OUTPATIENT)
Age: 39
End: 2024-04-21

## 2024-06-21 ENCOUNTER — PATIENT OUTREACH (OUTPATIENT)
Dept: CARE COORDINATION | Facility: CLINIC | Age: 39
End: 2024-06-21
Payer: COMMERCIAL

## 2024-07-05 ENCOUNTER — PATIENT OUTREACH (OUTPATIENT)
Dept: CARE COORDINATION | Facility: CLINIC | Age: 39
End: 2024-07-05
Payer: COMMERCIAL

## 2024-09-07 ENCOUNTER — HEALTH MAINTENANCE LETTER (OUTPATIENT)
Age: 39
End: 2024-09-07

## 2025-01-05 ENCOUNTER — HEALTH MAINTENANCE LETTER (OUTPATIENT)
Age: 40
End: 2025-01-05

## 2025-04-20 ENCOUNTER — HEALTH MAINTENANCE LETTER (OUTPATIENT)
Age: 40
End: 2025-04-20

## 2025-08-03 ENCOUNTER — HEALTH MAINTENANCE LETTER (OUTPATIENT)
Age: 40
End: 2025-08-03